# Patient Record
Sex: MALE | Race: WHITE | NOT HISPANIC OR LATINO | Employment: FULL TIME | URBAN - NONMETROPOLITAN AREA
[De-identification: names, ages, dates, MRNs, and addresses within clinical notes are randomized per-mention and may not be internally consistent; named-entity substitution may affect disease eponyms.]

---

## 2018-02-13 ENCOUNTER — APPOINTMENT (OUTPATIENT)
Dept: GENERAL RADIOLOGY | Facility: HOSPITAL | Age: 53
End: 2018-02-13

## 2018-02-13 PROCEDURE — 71046 X-RAY EXAM CHEST 2 VIEWS: CPT

## 2019-01-03 ENCOUNTER — OFFICE VISIT (OUTPATIENT)
Dept: GASTROENTEROLOGY | Facility: CLINIC | Age: 54
End: 2019-01-03

## 2019-01-03 VITALS
DIASTOLIC BLOOD PRESSURE: 90 MMHG | BODY MASS INDEX: 33.62 KG/M2 | TEMPERATURE: 98.1 F | HEART RATE: 91 BPM | SYSTOLIC BLOOD PRESSURE: 162 MMHG | HEIGHT: 74 IN | OXYGEN SATURATION: 98 % | WEIGHT: 262 LBS

## 2019-01-03 DIAGNOSIS — Z12.11 COLON CANCER SCREENING: Primary | ICD-10-CM

## 2019-01-03 PROCEDURE — S0285 CNSLT BEFORE SCREEN COLONOSC: HCPCS | Performed by: NURSE PRACTITIONER

## 2019-01-03 RX ORDER — SODIUM, POTASSIUM,MAG SULFATES 17.5-3.13G
1 SOLUTION, RECONSTITUTED, ORAL ORAL EVERY 12 HOURS
Qty: 1 BOTTLE | Refills: 0 | Status: SHIPPED | OUTPATIENT
Start: 2019-01-03 | End: 2019-04-23

## 2019-01-03 RX ORDER — OMEPRAZOLE 20 MG/1
20 CAPSULE, DELAYED RELEASE ORAL DAILY
COMMUNITY

## 2019-01-03 NOTE — PROGRESS NOTES
Chief Complaint   Patient presents with   • Colon Cancer Screening     Patient is here today for colon screening.     Subjective   HPI  Kvng Cedillo is a 53 y.o. male who presents as a referral for preventative maintenance. He has no complaints of nausea or vomiting. No change in bowels. No wt loss. No BRBPR. No melena. There is no family hx for colon cancer. No abdominal pain. There was no Cologuard screening this year. The patients states that he had a colonoscopy about 15 years ago found to be normal.   Past Medical History:   Diagnosis Date   • Diabetes mellitus (CMS/HCC)    • GERD (gastroesophageal reflux disease)    • Hyperlipidemia    • Hypertension    • Seasonal allergies      Past Surgical History:   Procedure Laterality Date   • CHOLECYSTECTOMY     • COLONOSCOPY  2002   • KNEE SURGERY     • UPPER GASTROINTESTINAL ENDOSCOPY  12/29/2005       Current Outpatient Medications:   •  AmLODIPine Besylate (NORVASC PO), Take  by mouth., Disp: , Rfl:   •  aspirin 81 MG EC tablet, Take 81 mg by mouth Daily., Disp: , Rfl:   •  IRBESARTAN PO, Take  by mouth., Disp: , Rfl:   •  METFORMIN HCL PO, Take  by mouth., Disp: , Rfl:   •  montelukast (SINGULAIR) 10 MG tablet, Take 10 mg by mouth Every Night., Disp: , Rfl:   •  omeprazole (priLOSEC) 20 MG capsule, Take 20 mg by mouth Daily., Disp: , Rfl:   •  PRAVASTATIN SODIUM PO, Take  by mouth., Disp: , Rfl:   •  sodium-potassium-magnesium sulfates (SUPREP BOWEL PREP KIT) 17.5-3.13-1.6 GM/177ML solution oral solution, Take 1 bottle by mouth Every 12 (Twelve) Hours., Disp: 1 bottle, Rfl: 0  Allergies   Allergen Reactions   • Ace Inhibitors Dizziness and Cough     Social History     Socioeconomic History   • Marital status:      Spouse name: Not on file   • Number of children: Not on file   • Years of education: Not on file   • Highest education level: Not on file   Social Needs   • Financial resource strain: Not on file   • Food insecurity - worry: Not on file   • Food  "insecurity - inability: Not on file   • Transportation needs - medical: Not on file   • Transportation needs - non-medical: Not on file   Occupational History   • Not on file   Tobacco Use   • Smoking status: Current Every Day Smoker     Packs/day: 1.00     Types: Cigarettes   • Smokeless tobacco: Never Used   Substance and Sexual Activity   • Alcohol use: Yes   • Drug use: No   • Sexual activity: Defer   Other Topics Concern   • Not on file   Social History Narrative   • Not on file     Family History   Problem Relation Age of Onset   • Colon cancer Neg Hx    • Colon polyps Neg Hx        REVIEW OF SYSTEMS  General: well appearing, no fever chills or sweats, no unexplained wt loss  HEENT: no acute visual or hearing disturbances  Cardiovascular: No chest pain or palpitations  Pulmonary: No shortness of breath, coughing, wheezing or hemoptysis  : No burning, urgency, hematuria, or dysuria  Musculoskeletal: No joint pain or stiffness  Peripheral: no edema  Skin: No lesions or rashes  Neuro: No dizziness, headaches, stroke, syncope  Endocrine: No hot or cold intolerances  Hematological: No blood dyscrasias    Objective   Vitals:    01/03/19 1441   BP: 162/90   Pulse: 91   Temp: 98.1 °F (36.7 °C)   SpO2: 98%   Weight: 119 kg (262 lb)   Height: 188 cm (74\")     Body mass index is 33.64 kg/m².    PHYSICAL EXAM  General: age appropriate well nourished well appearing, no acute distress  Head: normocephalic and atraumatic  Global assessment-supple  Neck-No JVD noted, no lymphadenopathy  Pulmonary-clear to auscultation bilaterally, normal respiratory effort  Cardiovascular-normal rate and rhythm, normal heart sounds, S1 and S2 noted  Abdomen-soft, non tender, non distended, normal bowel sounds all 4 quadrants, no hepatosplenomegaly noted  Extremities-No clubbing cyanosis or edema  Neuro-Non focal, converses appropriately, awake, alert, oriented    Imaging Results (most recent)     None        Assessment/Plan   Kvng was seen " today for colon cancer screening.    Diagnoses and all orders for this visit:    Colon cancer screening  -     Case Request; Standing  -     Case Request    Other orders  -     Follow Anesthesia Guidelines / Standing Orders; Future  -     Implement Anesthesia Orders Day of Procedure; Standing  -     Obtain Informed Consent; Standing  -     Verify bowel prep was successful; Standing  -     sodium-potassium-magnesium sulfates (SUPREP BOWEL PREP KIT) 17.5-3.13-1.6 GM/177ML solution oral solution; Take 1 bottle by mouth Every 12 (Twelve) Hours.      COLONOSCOPY WITH ANESTHESIA (N/A)       Body mass index is 33.64 kg/m². Patient's Body mass index is 33.64 kg/m². BMI is above normal parameters. Recommendations include: no follow-up required.      All risks, benefits, alternatives, and indications of colonoscopy procedure have been discussed with the patient. Risks to include perforation of the colon requiring possible surgery or colostomy, risk of bleeding from biopsies or removal of colon tissue, possibility of missing a colon polyp or cancer, or adverse drug reaction.  Benefits to include the diagnosis and management of disease of the colon and rectum. Alternatives to include barium enema, radiographic evaluation, lab testing or no intervention. Pt verbalizes understanding and agrees.

## 2019-01-25 ENCOUNTER — ANESTHESIA EVENT (OUTPATIENT)
Dept: GASTROENTEROLOGY | Facility: HOSPITAL | Age: 54
End: 2019-01-25

## 2019-01-25 ENCOUNTER — ANESTHESIA (OUTPATIENT)
Dept: GASTROENTEROLOGY | Facility: HOSPITAL | Age: 54
End: 2019-01-25

## 2019-01-25 ENCOUNTER — HOSPITAL ENCOUNTER (OUTPATIENT)
Facility: HOSPITAL | Age: 54
Setting detail: HOSPITAL OUTPATIENT SURGERY
Discharge: HOME | End: 2019-01-25
Attending: INTERNAL MEDICINE | Admitting: INTERNAL MEDICINE

## 2019-01-25 VITALS
SYSTOLIC BLOOD PRESSURE: 146 MMHG | DIASTOLIC BLOOD PRESSURE: 84 MMHG | WEIGHT: 255 LBS | TEMPERATURE: 97 F | OXYGEN SATURATION: 97 % | RESPIRATION RATE: 19 BRPM | HEIGHT: 73 IN | HEART RATE: 73 BPM | BODY MASS INDEX: 33.8 KG/M2

## 2019-01-25 DIAGNOSIS — Z12.11 COLON CANCER SCREENING: ICD-10-CM

## 2019-01-25 PROCEDURE — 88305 TISSUE EXAM BY PATHOLOGIST: CPT | Performed by: INTERNAL MEDICINE

## 2019-01-25 PROCEDURE — 25010000002 PROPOFOL 10 MG/ML EMULSION: Performed by: NURSE ANESTHETIST, CERTIFIED REGISTERED

## 2019-01-25 PROCEDURE — 45385 COLONOSCOPY W/LESION REMOVAL: CPT | Performed by: INTERNAL MEDICINE

## 2019-01-25 RX ORDER — SODIUM CHLORIDE 0.9 % (FLUSH) 0.9 %
3 SYRINGE (ML) INJECTION AS NEEDED
Status: DISCONTINUED | OUTPATIENT
Start: 2019-01-25 | End: 2019-01-25 | Stop reason: HOSPADM

## 2019-01-25 RX ORDER — LIDOCAINE HYDROCHLORIDE 20 MG/ML
INJECTION, SOLUTION INFILTRATION; PERINEURAL AS NEEDED
Status: DISCONTINUED | OUTPATIENT
Start: 2019-01-25 | End: 2019-01-25 | Stop reason: SURG

## 2019-01-25 RX ORDER — SODIUM CHLORIDE 9 MG/ML
500 INJECTION, SOLUTION INTRAVENOUS CONTINUOUS PRN
Status: DISCONTINUED | OUTPATIENT
Start: 2019-01-25 | End: 2019-01-25 | Stop reason: HOSPADM

## 2019-01-25 RX ORDER — PROPOFOL 10 MG/ML
VIAL (ML) INTRAVENOUS AS NEEDED
Status: DISCONTINUED | OUTPATIENT
Start: 2019-01-25 | End: 2019-01-25 | Stop reason: SURG

## 2019-01-25 RX ADMIN — LIDOCAINE HYDROCHLORIDE 100 MG: 20 INJECTION, SOLUTION INFILTRATION; PERINEURAL at 09:18

## 2019-01-25 RX ADMIN — SODIUM CHLORIDE 500 ML: 9 INJECTION, SOLUTION INTRAVENOUS at 07:55

## 2019-01-25 RX ADMIN — PROPOFOL 625 MG: 10 INJECTION, EMULSION INTRAVENOUS at 09:18

## 2019-01-25 NOTE — ANESTHESIA PREPROCEDURE EVALUATION
Anesthesia Evaluation     Patient summary reviewed   no history of anesthetic complications:  NPO Solid Status: > 8 hours             Airway   Mallampati: II  TM distance: >3 FB  Neck ROM: full  Dental      Pulmonary    (+) a smoker,   Cardiovascular   Exercise tolerance: excellent (>7 METS)    (+) hypertension, hyperlipidemia,       Neuro/Psych  (+) TIA,     GI/Hepatic/Renal/Endo    (+) obesity,   diabetes mellitus,     Musculoskeletal     Abdominal    Substance History      OB/GYN          Other                        Anesthesia Plan    ASA 2     general     intravenous induction   Anesthetic plan, all risks, benefits, and alternatives have been provided, discussed and informed consent has been obtained with: patient.

## 2019-01-25 NOTE — ANESTHESIA POSTPROCEDURE EVALUATION
"Patient: Kvng Cedillo    Procedure Summary     Date:  01/25/19 Room / Location:  [facility] ENDOSCOPY 5 / [facility]COPY    Anesthesia Start:  0914 Anesthesia Stop:  0951    Procedure:  COLONOSCOPY WITH ANESTHESIA (N/A ) Diagnosis:       Colon cancer screening      (Colon cancer screening [Z12.11])    Surgeon:  Jesika Mcdaniel MD Provider:  Yara Nunez CRNA    Anesthesia Type:  general ASA Status:  2          Anesthesia Type: general  Last vitals  BP   142/67 (01/25/19 0955)   Temp   97 °F (36.1 °C) (01/25/19 0749)   Pulse   98 (01/25/19 0955)   Resp   21 (01/25/19 0955)     SpO2   94 % (01/25/19 0955)     Post Anesthesia Care and Evaluation    Patient location during evaluation: bedside  Patient participation: complete - patient participated  Level of consciousness: awake and awake and alert  Pain score: 0  Pain management: adequate  Airway patency: patent  Anesthetic complications: No anesthetic complications  PONV Status: none  Cardiovascular status: acceptable  Respiratory status: acceptable  Hydration status: acceptable    Comments: Patient discharged according to acceptable Jennifer score per RN assessment. See nursing records for further information.     Blood pressure 142/67, pulse 98, temperature 97 °F (36.1 °C), temperature source Temporal, resp. rate 21, height 185.4 cm (73\"), weight 116 kg (255 lb), SpO2 94 %.        "

## 2019-01-28 LAB
CYTO UR: NORMAL
LAB AP CASE REPORT: NORMAL
LAB AP CLINICAL INFORMATION: NORMAL
PATH REPORT.FINAL DX SPEC: NORMAL
PATH REPORT.GROSS SPEC: NORMAL

## 2020-05-30 ENCOUNTER — HOSPITAL ENCOUNTER (EMERGENCY)
Facility: HOSPITAL | Age: 55
Discharge: HOME | End: 2020-05-30
Attending: FAMILY MEDICINE | Admitting: FAMILY MEDICINE

## 2020-05-30 VITALS
SYSTOLIC BLOOD PRESSURE: 162 MMHG | HEART RATE: 88 BPM | RESPIRATION RATE: 16 BRPM | OXYGEN SATURATION: 97 % | WEIGHT: 254 LBS | HEIGHT: 74 IN | BODY MASS INDEX: 32.6 KG/M2 | DIASTOLIC BLOOD PRESSURE: 100 MMHG | TEMPERATURE: 98 F

## 2020-05-30 DIAGNOSIS — K92.2 GASTROINTESTINAL HEMORRHAGE, UNSPECIFIED GASTROINTESTINAL HEMORRHAGE TYPE: Primary | ICD-10-CM

## 2020-05-30 LAB
ALBUMIN SERPL-MCNC: 4.8 G/DL (ref 3.5–5.2)
ALBUMIN/GLOB SERPL: 1.7 G/DL
ALP SERPL-CCNC: 66 U/L (ref 39–117)
ALT SERPL W P-5'-P-CCNC: 75 U/L (ref 1–41)
ANION GAP SERPL CALCULATED.3IONS-SCNC: 19 MMOL/L (ref 5–15)
AST SERPL-CCNC: 39 U/L (ref 1–40)
BASOPHILS # BLD AUTO: 0.07 10*3/MM3 (ref 0–0.2)
BASOPHILS NFR BLD AUTO: 0.8 % (ref 0–1.5)
BILIRUB SERPL-MCNC: 0.5 MG/DL (ref 0.2–1.2)
BUN BLD-MCNC: 12 MG/DL (ref 6–20)
BUN/CREAT SERPL: 16.7 (ref 7–25)
CALCIUM SPEC-SCNC: 9.7 MG/DL (ref 8.6–10.5)
CHLORIDE SERPL-SCNC: 98 MMOL/L (ref 98–107)
CO2 SERPL-SCNC: 21 MMOL/L (ref 22–29)
CREAT BLD-MCNC: 0.72 MG/DL (ref 0.76–1.27)
DEPRECATED RDW RBC AUTO: 37.1 FL (ref 37–54)
DEVELOPER EXPIRATION DATE: ABNORMAL
DEVELOPER LOT NUMBER: 171
EOSINOPHIL # BLD AUTO: 0.09 10*3/MM3 (ref 0–0.4)
EOSINOPHIL NFR BLD AUTO: 1 % (ref 0.3–6.2)
ERYTHROCYTE [DISTWIDTH] IN BLOOD BY AUTOMATED COUNT: 11.6 % (ref 12.3–15.4)
EXPIRATION DATE: ABNORMAL
FECAL OCCULT BLOOD SCREEN, POC: POSITIVE
GFR SERPL CREATININE-BSD FRML MDRD: 113 ML/MIN/1.73
GLOBULIN UR ELPH-MCNC: 2.9 GM/DL
GLUCOSE BLD-MCNC: 191 MG/DL (ref 65–99)
HCT VFR BLD AUTO: 45.2 % (ref 37.5–51)
HGB BLD-MCNC: 16.1 G/DL (ref 13–17.7)
HOLD SPECIMEN: NORMAL
HOLD SPECIMEN: NORMAL
IMM GRANULOCYTES # BLD AUTO: 0.02 10*3/MM3 (ref 0–0.05)
IMM GRANULOCYTES NFR BLD AUTO: 0.2 % (ref 0–0.5)
INR PPP: 1.04 (ref 0.91–1.09)
LYMPHOCYTES # BLD AUTO: 1.98 10*3/MM3 (ref 0.7–3.1)
LYMPHOCYTES NFR BLD AUTO: 22.9 % (ref 19.6–45.3)
Lab: 171
MCH RBC QN AUTO: 31.3 PG (ref 26.6–33)
MCHC RBC AUTO-ENTMCNC: 35.6 G/DL (ref 31.5–35.7)
MCV RBC AUTO: 87.8 FL (ref 79–97)
MONOCYTES # BLD AUTO: 0.71 10*3/MM3 (ref 0.1–0.9)
MONOCYTES NFR BLD AUTO: 8.2 % (ref 5–12)
NEGATIVE CONTROL: NEGATIVE
NEUTROPHILS # BLD AUTO: 5.78 10*3/MM3 (ref 1.7–7)
NEUTROPHILS NFR BLD AUTO: 66.9 % (ref 42.7–76)
NRBC BLD AUTO-RTO: 0 /100 WBC (ref 0–0.2)
PLATELET # BLD AUTO: 176 10*3/MM3 (ref 140–450)
PMV BLD AUTO: 9.7 FL (ref 6–12)
POSITIVE CONTROL: POSITIVE
POTASSIUM BLD-SCNC: 3.9 MMOL/L (ref 3.5–5.2)
PROT SERPL-MCNC: 7.7 G/DL (ref 6–8.5)
PROTHROMBIN TIME: 13.2 SECONDS (ref 11.9–14.6)
RBC # BLD AUTO: 5.15 10*6/MM3 (ref 4.14–5.8)
SODIUM BLD-SCNC: 138 MMOL/L (ref 136–145)
WBC NRBC COR # BLD: 8.65 10*3/MM3 (ref 3.4–10.8)
WHOLE BLOOD HOLD SPECIMEN: NORMAL
WHOLE BLOOD HOLD SPECIMEN: NORMAL

## 2020-05-30 PROCEDURE — 99283 EMERGENCY DEPT VISIT LOW MDM: CPT

## 2020-05-30 PROCEDURE — 85610 PROTHROMBIN TIME: CPT | Performed by: FAMILY MEDICINE

## 2020-05-30 PROCEDURE — 80053 COMPREHEN METABOLIC PANEL: CPT | Performed by: FAMILY MEDICINE

## 2020-05-30 PROCEDURE — 82270 OCCULT BLOOD FECES: CPT | Performed by: FAMILY MEDICINE

## 2020-05-30 PROCEDURE — 85025 COMPLETE CBC W/AUTO DIFF WBC: CPT | Performed by: FAMILY MEDICINE

## 2020-05-30 RX ORDER — MULTIVITAMIN WITH IRON
TABLET ORAL
COMMUNITY
End: 2022-12-07

## 2020-05-30 RX ORDER — TADALAFIL 5 MG/1
5 TABLET ORAL DAILY PRN
COMMUNITY

## 2020-05-30 RX ADMIN — SODIUM CHLORIDE 500 ML: 9 INJECTION, SOLUTION INTRAVENOUS at 17:44

## 2020-06-01 ENCOUNTER — TELEPHONE (OUTPATIENT)
Dept: GASTROENTEROLOGY | Facility: CLINIC | Age: 55
End: 2020-06-01

## 2020-06-01 NOTE — TELEPHONE ENCOUNTER
Patient was seen in the ER Saturday and the ER Dr called sw Dr. Bautista about pt. Pt was discharged with instructions to call our office today if he was still having issues. Dr. Bautista asked if I would call and check on pt today and see how he was doing. He states that he is feeling much better and not having anymore bleeding. I encouraged pt to call us it he does have anymore issue. Pt vu.

## 2021-03-25 ENCOUNTER — IMMUNIZATION (OUTPATIENT)
Dept: VACCINE CLINIC | Facility: HOSPITAL | Age: 56
End: 2021-03-25

## 2021-03-25 PROCEDURE — 91301 HC SARSCO02 VAC 100MCG/0.5ML IM: CPT | Performed by: OBSTETRICS & GYNECOLOGY

## 2021-03-25 PROCEDURE — 0011A: CPT | Performed by: OBSTETRICS & GYNECOLOGY

## 2021-04-22 ENCOUNTER — IMMUNIZATION (OUTPATIENT)
Dept: VACCINE CLINIC | Facility: HOSPITAL | Age: 56
End: 2021-04-22

## 2021-04-22 PROCEDURE — 0012A: CPT | Performed by: OBSTETRICS & GYNECOLOGY

## 2021-04-22 PROCEDURE — 91301 HC SARSCO02 VAC 100MCG/0.5ML IM: CPT | Performed by: OBSTETRICS & GYNECOLOGY

## 2022-12-07 ENCOUNTER — APPOINTMENT (OUTPATIENT)
Dept: CARDIOLOGY | Facility: HOSPITAL | Age: 57
End: 2022-12-07

## 2022-12-07 ENCOUNTER — APPOINTMENT (OUTPATIENT)
Dept: GENERAL RADIOLOGY | Facility: HOSPITAL | Age: 57
End: 2022-12-07

## 2022-12-07 ENCOUNTER — HOSPITAL ENCOUNTER (EMERGENCY)
Facility: HOSPITAL | Age: 57
Discharge: HOME | End: 2022-12-07
Attending: STUDENT IN AN ORGANIZED HEALTH CARE EDUCATION/TRAINING PROGRAM | Admitting: STUDENT IN AN ORGANIZED HEALTH CARE EDUCATION/TRAINING PROGRAM

## 2022-12-07 VITALS
HEIGHT: 74 IN | TEMPERATURE: 98 F | OXYGEN SATURATION: 100 % | RESPIRATION RATE: 18 BRPM | BODY MASS INDEX: 31.44 KG/M2 | SYSTOLIC BLOOD PRESSURE: 135 MMHG | HEART RATE: 81 BPM | WEIGHT: 245 LBS | DIASTOLIC BLOOD PRESSURE: 91 MMHG

## 2022-12-07 DIAGNOSIS — F17.200 SMOKER: ICD-10-CM

## 2022-12-07 DIAGNOSIS — R06.02 SHORTNESS OF BREATH: ICD-10-CM

## 2022-12-07 DIAGNOSIS — R07.9 ACUTE CHEST PAIN: Primary | ICD-10-CM

## 2022-12-07 DIAGNOSIS — R07.9 EXERTIONAL CHEST PAIN: ICD-10-CM

## 2022-12-07 DIAGNOSIS — Z86.39 HISTORY OF DIABETES MELLITUS: ICD-10-CM

## 2022-12-07 LAB
ALBUMIN SERPL-MCNC: 4.5 G/DL (ref 3.5–5.2)
ALBUMIN/GLOB SERPL: 1.9 G/DL
ALP SERPL-CCNC: 59 U/L (ref 39–117)
ALT SERPL W P-5'-P-CCNC: 38 U/L (ref 1–41)
ANION GAP SERPL CALCULATED.3IONS-SCNC: 9 MMOL/L (ref 5–15)
AST SERPL-CCNC: 23 U/L (ref 1–40)
BASOPHILS # BLD AUTO: 0.04 10*3/MM3 (ref 0–0.2)
BASOPHILS NFR BLD AUTO: 0.7 % (ref 0–1.5)
BH CV STRESS BP STAGE 1: NORMAL
BH CV STRESS BP STAGE 2: NORMAL
BH CV STRESS BP STAGE 3: NORMAL
BH CV STRESS DURATION MIN STAGE 1: 3
BH CV STRESS DURATION MIN STAGE 2: 3
BH CV STRESS DURATION MIN STAGE 3: 2
BH CV STRESS DURATION SEC STAGE 1: 0
BH CV STRESS DURATION SEC STAGE 2: 0
BH CV STRESS DURATION SEC STAGE 3: 0
BH CV STRESS GRADE STAGE 1: 10
BH CV STRESS GRADE STAGE 2: 12
BH CV STRESS GRADE STAGE 3: 14
BH CV STRESS HR STAGE 1: 120
BH CV STRESS HR STAGE 2: 142
BH CV STRESS HR STAGE 3: 162
BH CV STRESS METS STAGE 1: 5
BH CV STRESS METS STAGE 2: 7.5
BH CV STRESS METS STAGE 3: 10
BH CV STRESS PROTOCOL 1: NORMAL
BH CV STRESS RECOVERY BP: NORMAL MMHG
BH CV STRESS RECOVERY HR: 100 BPM
BH CV STRESS SPEED STAGE 1: 1.7
BH CV STRESS SPEED STAGE 2: 2.5
BH CV STRESS SPEED STAGE 3: 3.4
BH CV STRESS STAGE 1: 1
BH CV STRESS STAGE 2: 2
BH CV STRESS STAGE 3: 3
BILIRUB SERPL-MCNC: 0.6 MG/DL (ref 0–1.2)
BUN SERPL-MCNC: 19 MG/DL (ref 6–20)
BUN/CREAT SERPL: 24.1 (ref 7–25)
CALCIUM SPEC-SCNC: 8.6 MG/DL (ref 8.6–10.5)
CHLORIDE SERPL-SCNC: 101 MMOL/L (ref 98–107)
CO2 SERPL-SCNC: 25 MMOL/L (ref 22–29)
CREAT SERPL-MCNC: 0.79 MG/DL (ref 0.76–1.27)
DEPRECATED RDW RBC AUTO: 38.7 FL (ref 37–54)
EGFRCR SERPLBLD CKD-EPI 2021: 103 ML/MIN/1.73
EOSINOPHIL # BLD AUTO: 0.11 10*3/MM3 (ref 0–0.4)
EOSINOPHIL NFR BLD AUTO: 2 % (ref 0.3–6.2)
ERYTHROCYTE [DISTWIDTH] IN BLOOD BY AUTOMATED COUNT: 11.8 % (ref 12.3–15.4)
GLOBULIN UR ELPH-MCNC: 2.4 GM/DL
GLUCOSE SERPL-MCNC: 176 MG/DL (ref 65–99)
HCT VFR BLD AUTO: 46.3 % (ref 37.5–51)
HGB BLD-MCNC: 16.1 G/DL (ref 13–17.7)
HOLD SPECIMEN: NORMAL
HOLD SPECIMEN: NORMAL
IMM GRANULOCYTES # BLD AUTO: 0.02 10*3/MM3 (ref 0–0.05)
IMM GRANULOCYTES NFR BLD AUTO: 0.4 % (ref 0–0.5)
INR PPP: 0.97 (ref 0.91–1.09)
LYMPHOCYTES # BLD AUTO: 1.61 10*3/MM3 (ref 0.7–3.1)
LYMPHOCYTES NFR BLD AUTO: 28.6 % (ref 19.6–45.3)
MAXIMAL PREDICTED HEART RATE: 162 BPM
MCH RBC QN AUTO: 31.5 PG (ref 26.6–33)
MCHC RBC AUTO-ENTMCNC: 34.8 G/DL (ref 31.5–35.7)
MCV RBC AUTO: 90.6 FL (ref 79–97)
MONOCYTES # BLD AUTO: 0.51 10*3/MM3 (ref 0.1–0.9)
MONOCYTES NFR BLD AUTO: 9.1 % (ref 5–12)
NEUTROPHILS NFR BLD AUTO: 3.33 10*3/MM3 (ref 1.7–7)
NEUTROPHILS NFR BLD AUTO: 59.2 % (ref 42.7–76)
NRBC BLD AUTO-RTO: 0 /100 WBC (ref 0–0.2)
NT-PROBNP SERPL-MCNC: <36 PG/ML (ref 0–900)
PERCENT MAX PREDICTED HR: 100 %
PLATELET # BLD AUTO: 182 10*3/MM3 (ref 140–450)
PMV BLD AUTO: 9.3 FL (ref 6–12)
POTASSIUM SERPL-SCNC: 4.3 MMOL/L (ref 3.5–5.2)
PROT SERPL-MCNC: 6.9 G/DL (ref 6–8.5)
PROTHROMBIN TIME: 12.9 SECONDS (ref 11.8–14.8)
RBC # BLD AUTO: 5.11 10*6/MM3 (ref 4.14–5.8)
SODIUM SERPL-SCNC: 135 MMOL/L (ref 136–145)
STRESS BASELINE BP: NORMAL MMHG
STRESS BASELINE HR: 78 BPM
STRESS PERCENT HR: 118 %
STRESS POST ESTIMATED WORKLOAD: 10 METS
STRESS POST EXERCISE DUR MIN: 8 MIN
STRESS POST EXERCISE DUR SEC: 0 SEC
STRESS POST PEAK BP: NORMAL MMHG
STRESS POST PEAK HR: 162 BPM
STRESS TARGET HR: 138 BPM
TROPONIN T SERPL-MCNC: <0.01 NG/ML (ref 0–0.03)
TROPONIN T SERPL-MCNC: <0.01 NG/ML (ref 0–0.03)
WBC NRBC COR # BLD: 5.62 10*3/MM3 (ref 3.4–10.8)
WHOLE BLOOD HOLD COAG: NORMAL
WHOLE BLOOD HOLD SPECIMEN: NORMAL

## 2022-12-07 PROCEDURE — 80053 COMPREHEN METABOLIC PANEL: CPT | Performed by: STUDENT IN AN ORGANIZED HEALTH CARE EDUCATION/TRAINING PROGRAM

## 2022-12-07 PROCEDURE — 85025 COMPLETE CBC W/AUTO DIFF WBC: CPT | Performed by: STUDENT IN AN ORGANIZED HEALTH CARE EDUCATION/TRAINING PROGRAM

## 2022-12-07 PROCEDURE — 84484 ASSAY OF TROPONIN QUANT: CPT | Performed by: STUDENT IN AN ORGANIZED HEALTH CARE EDUCATION/TRAINING PROGRAM

## 2022-12-07 PROCEDURE — 71045 X-RAY EXAM CHEST 1 VIEW: CPT

## 2022-12-07 PROCEDURE — 93352 ADMIN ECG CONTRAST AGENT: CPT | Performed by: INTERNAL MEDICINE

## 2022-12-07 PROCEDURE — 99284 EMERGENCY DEPT VISIT MOD MDM: CPT

## 2022-12-07 PROCEDURE — 25010000002 PERFLUTREN 6.52 MG/ML SUSPENSION: Performed by: INTERNAL MEDICINE

## 2022-12-07 PROCEDURE — 93350 STRESS TTE ONLY: CPT | Performed by: INTERNAL MEDICINE

## 2022-12-07 PROCEDURE — 93005 ELECTROCARDIOGRAM TRACING: CPT | Performed by: STUDENT IN AN ORGANIZED HEALTH CARE EDUCATION/TRAINING PROGRAM

## 2022-12-07 PROCEDURE — 93017 CV STRESS TEST TRACING ONLY: CPT

## 2022-12-07 PROCEDURE — 93350 STRESS TTE ONLY: CPT

## 2022-12-07 PROCEDURE — 93010 ELECTROCARDIOGRAM REPORT: CPT | Performed by: INTERNAL MEDICINE

## 2022-12-07 PROCEDURE — 85610 PROTHROMBIN TIME: CPT | Performed by: STUDENT IN AN ORGANIZED HEALTH CARE EDUCATION/TRAINING PROGRAM

## 2022-12-07 PROCEDURE — 36415 COLL VENOUS BLD VENIPUNCTURE: CPT

## 2022-12-07 PROCEDURE — 93018 CV STRESS TEST I&R ONLY: CPT | Performed by: INTERNAL MEDICINE

## 2022-12-07 PROCEDURE — 83880 ASSAY OF NATRIURETIC PEPTIDE: CPT | Performed by: STUDENT IN AN ORGANIZED HEALTH CARE EDUCATION/TRAINING PROGRAM

## 2022-12-07 PROCEDURE — 93005 ELECTROCARDIOGRAM TRACING: CPT

## 2022-12-07 RX ORDER — ASPIRIN 81 MG/1
324 TABLET, CHEWABLE ORAL ONCE
Status: DISCONTINUED | OUTPATIENT
Start: 2022-12-07 | End: 2022-12-07

## 2022-12-07 RX ORDER — SODIUM CHLORIDE 0.9 % (FLUSH) 0.9 %
10 SYRINGE (ML) INJECTION AS NEEDED
Status: DISCONTINUED | OUTPATIENT
Start: 2022-12-07 | End: 2022-12-07 | Stop reason: HOSPADM

## 2022-12-07 RX ADMIN — PERFLUTREN 8.48 MG: 6.52 INJECTION, SUSPENSION INTRAVENOUS at 09:36

## 2022-12-07 NOTE — ED PROVIDER NOTES
EMERGENCY DEPARTMENT HISTORY AND PHYSICAL EXAM    Patient Name: Kvng Cedillo    Chief Complaint   Patient presents with   • Chest Pain       History of Presenting Illness:  Kvng Cedillo is a 57 y.o. male with history of diabetes mellitus as well as hypertension hyperlipidemia presents emerged department due to chest pain.    Patient states he is feeling fine and woke up.  He was walking up a hill when he had exertional chest pain described as right-sided in nature folic it took his breath away.  This is ultimately presents the emergency department.  He has had stress test in the past at [facility] but not for many years.  He has history of diabetes hyperlipidemia hypertension and is a smoker.  Denies any family history myocardial infarction.  No recent chills or fever.  No nausea vomiting or abdominal pain.  Currently is chest pain-free.    Patient took 325 mg of aspirin earlier this morning.      Past Medical History:   Past Medical History:   Diagnosis Date   • Diabetes mellitus (HCC)    • GERD (gastroesophageal reflux disease)    • Hyperlipidemia    • Hypertension    • Seasonal allergies    • TIA (transient ischemic attack)        Past Surgical History:   Past Surgical History:   Procedure Laterality Date   • CHOLECYSTECTOMY     • COLONOSCOPY  2002   • COLONOSCOPY N/A 1/25/2019    Procedure: COLONOSCOPY WITH ANESTHESIA;  Surgeon: Jesika Mcdaniel MD;  Location: [facility] ENDOSCOPY;  Service: Gastroenterology   • KNEE SURGERY     • UPPER GASTROINTESTINAL ENDOSCOPY  12/29/2005       Family History:   Family History   Problem Relation Age of Onset   • Colon cancer Neg Hx    • Colon polyps Neg Hx        Social History:   Daily tobacco  Denies EtOH  Denies marijuana, cocaine, or IV drugs    Allergies:   Allergies   Allergen Reactions   • Ace Inhibitors Dizziness and Cough       Medications:     Current Facility-Administered Medications:   •  sodium chloride 0.9 % flush 10 mL, 10 mL, Intravenous, PRN,  "Kash Madrigal MD    Current Outpatient Medications:   •  alfuzosin (UROXATRAL) 10 MG 24 hr tablet, Take  by mouth Daily., Disp: , Rfl: 5  •  AmLODIPine Besylate (NORVASC PO), Take 5 mg by mouth Daily., Disp: , Rfl:   •  aspirin 81 MG EC tablet, Take 325 mg by mouth Daily., Disp: , Rfl:   •  Fexofenadine HCl (MUCINEX ALLERGY PO), Take  by mouth As Needed., Disp: , Rfl:   •  IRBESARTAN PO, Take  by mouth., Disp: , Rfl:   •  omeprazole (priLOSEC) 20 MG capsule, Take 20 mg by mouth Daily., Disp: , Rfl:   •  PRAVASTATIN SODIUM PO, Take  by mouth., Disp: , Rfl:   •  tadalafil (CIALIS) 5 MG tablet, Take 5 mg by mouth Daily As Needed for Erectile Dysfunction., Disp: , Rfl:   •  METFORMIN HCL PO, Take  by mouth., Disp: , Rfl:     Review of Systems:  A full review of systems was obtained and is negative unless otherwise stated in HPI.    Physical Exam:   VS: /88   Pulse 77   Temp 97.9 °F (36.6 °C)   Resp 18   Ht 188 cm (74\")   Wt 111 kg (245 lb)   SpO2 95%   BMI 31.46 kg/m²   GENERAL: Well-appearing middle-age man sitting up in stretcher no acute distress; well nourished, well developed, awake, alert, no acute distress, nontoxic appearing, comfortable  EYES: PERRL, sclera anicteric, extra-occular movements grossly intact, symmetric lids  EARS, NOSE, MOUTH, THROAT: atraumatic external nose and ears, moist mucous membranes  NECK: Symmetric, trachea midline, no thyromegaly, no adenopathy, no meningismus  RESPIRATORY: Unlabored respiratory effort, clear to auscultation bilaterally, good air movement  CARDIOVASCULAR: No murmurs or gallops, peripheral pulses 2+ and equal in all extremities  GI: Soft, nontender, nondistended, bowel sounds present, no hepatosplenomegaly  LYMPHATIC: no lymphadenopathy  MUSCULOSKELETAL/EXTREMITIES: Extremities without obvious deformity, no cyanosis or clubbing  SKIN: warm and dry with no obvious rashes  NEUROLOGIC: moving all 4 extremities symmetrically, CN II-XII grossly " intact  PSYCHIATRIC: alert, pleasant and cooperative. Appropriate mood and affect.      Labs:  Labs Reviewed   COMPREHENSIVE METABOLIC PANEL - Abnormal; Notable for the following components:       Result Value    Glucose 176 (*)     Sodium 135 (*)     All other components within normal limits    Narrative:     GFR Normal >60  Chronic Kidney Disease <60  Kidney Failure <15     CBC WITH AUTO DIFFERENTIAL - Abnormal; Notable for the following components:    RDW 11.8 (*)     All other components within normal limits   TROPONIN (IN-HOUSE) - Normal    Narrative:     Troponin T Reference Range:  <= 0.03 ng/mL-   Negative for AMI  >0.03 ng/mL-     Abnormal for myocardial necrosis.  Clinicians would have to utilize clinical acumen, EKG, Troponin and serial changes to determine if it is an Acute Myocardial Infarction or myocardial injury due to an underlying chronic condition.       Results may be falsely decreased if patient taking Biotin.     PROBNP (REFERENCE) - Normal    Narrative:     Among patients with dyspnea, NT-proBNP is highly sensitive for the detection of acute congestive heart failure. In addition NT-proBNP of <300 pg/ml effectively rules out acute congestive heart failure with 99% negative predictive value.     PROTIME-INR - Normal   RAINBOW DRAW    Narrative:     The following orders were created for panel order [facility] Draw.  Procedure                               Abnormality         Status                     ---------                               -----------         ------                     Green Top (Gel)[[id number]]                                  Final result               Lavender Top[[id number]]                                     Final result               Red Top[[id number]]                                          Final result               Light Blue Top[[id number]]                                   Final result                 Please view results for these tests on the individual orders.    TROPONIN (IN-HOUSE)   CBC AND DIFFERENTIAL    Narrative:     The following orders were created for panel order CBC & Differential.  Procedure                               Abnormality         Status                     ---------                               -----------         ------                     CBC Auto Differential[[id number]]        Abnormal            Final result                 Please view results for these tests on the individual orders.   GREEN TOP   LAVENDER TOP   RED TOP   LIGHT BLUE TOP         Radiology:   XR Chest 1 View   Final Result   1. No active cardiopulmonary disease.   This report was finalized on 12/07/2022 06:04 by Dr. Angely Jensen MD.            Medical Decision Making:  Kvng Cedillo is a 57 y.o. male with a history of diabetes hypertension hyperlipidemia presents emergency department due to chest pain.    Reassuring vital signs and arrival.    I have reviewed and interpreted the EKG and it shows: NSR, rate of 88. Normal axis and intervals. No signs of acute ischemia such as ST elevation, ST depression, or T wave inversion. No signs of Hyperkalemia, WPW, PE, Pericarditis, LV aneurysm, Brugada, Heart Block, Atrial fibrillation or A flutter.    HEART score of 4.     Labs were ordered and reviewed.  CBC with normal white blood cell count hemoglobin.  Normal INR.  Normal BNP.  Normal blood cell count hemoglobin.  CMP is relatively unremarkable.  Negative troponin.    Imaging was ordered and reviewed.  Chest x-ray with no acute abnormality per my read.  Radiology read is pending.    Nursing notes were reviewed.    Unclear exact etiology of the patient's chest pain this time.  Given its exertional nature and concern for unstable angina.  He has had prior negative stress test but not for over a year.  Noted to STEMI based on EKG.  No evidence of NSTEMI based on a troponins.  Suspect for pulmonary embolism given history with normal vital signs on arrival.  Would consider aortic  dissection but given his benign appearance with equal pulses bilateral I feel this is unlikely is not anterior chest pain is not radiating and at time of my interview he was chest pain-free.  Only occurring with exertion making this less likely.    At time of my signout to the [facility] emergency medicine attending, Dr. Ramirez, cardiac stress testing is pending.  Final disposition pending results of cardiac stress testing.      ED Diagnosis:  Acute chest pain  Shortness of breath  Exertional chest pain  Smoker  History of diabetes mellitus      Disposition: pending results of cardiac stress testing at time of signout to the [facility] emergency medicine attending, Dr. Ramirez        Signed:  Kash Madrigal MD  Emergency Medicine Physician    Please note that portions of this note were completed with a voice recognition program.      Kash Madrigal MD  12/07/22 0700

## 2022-12-07 NOTE — ED PROVIDER NOTES
I took over the care of this patient from Dr. Madrigal, please see his note for the full history and physical.  We were awaiting a stress test which came back negative.  The patient was reassured by this and discharged home in stable condition.  He will follow-up with his primary care physician and was advised to monitor his blood pressure at home and keep a log     Omar Ramirez MD  12/07/22 1038

## 2022-12-08 LAB
QT INTERVAL: 382 MS
QT INTERVAL: 388 MS
QTC INTERVAL: 447 MS
QTC INTERVAL: 462 MS

## 2023-07-30 ENCOUNTER — HOSPITAL ENCOUNTER (INPATIENT)
Facility: HOSPITAL | Age: 58
LOS: 2 days | Discharge: HOME | DRG: 372 | End: 2023-08-01
Attending: EMERGENCY MEDICINE | Admitting: INTERNAL MEDICINE
Payer: COMMERCIAL

## 2023-07-30 ENCOUNTER — APPOINTMENT (OUTPATIENT)
Dept: CT IMAGING | Facility: HOSPITAL | Age: 58
DRG: 372 | End: 2023-07-30
Payer: COMMERCIAL

## 2023-07-30 DIAGNOSIS — I95.89 HYPOTENSION DUE TO HYPOVOLEMIA: ICD-10-CM

## 2023-07-30 DIAGNOSIS — R19.7 DIARRHEA, UNSPECIFIED TYPE: ICD-10-CM

## 2023-07-30 DIAGNOSIS — E86.1 HYPOTENSION DUE TO HYPOVOLEMIA: ICD-10-CM

## 2023-07-30 DIAGNOSIS — N17.9 ACUTE RENAL FAILURE, UNSPECIFIED ACUTE RENAL FAILURE TYPE: Primary | ICD-10-CM

## 2023-07-30 DIAGNOSIS — E87.6 HYPOKALEMIA: ICD-10-CM

## 2023-07-30 DIAGNOSIS — E86.9 VOLUME DEPLETION: ICD-10-CM

## 2023-07-30 PROBLEM — I95.9 HYPOTENSION: Status: ACTIVE | Noted: 2023-07-30

## 2023-07-30 LAB
ALBUMIN SERPL-MCNC: 4.4 G/DL (ref 3.5–5.2)
ALBUMIN/GLOB SERPL: 1.4 G/DL
ALP SERPL-CCNC: 46 U/L (ref 39–117)
ALT SERPL W P-5'-P-CCNC: 58 U/L (ref 1–41)
ANION GAP SERPL CALCULATED.3IONS-SCNC: 18 MMOL/L (ref 5–15)
AST SERPL-CCNC: 33 U/L (ref 1–40)
BILIRUB SERPL-MCNC: 0.9 MG/DL (ref 0–1.2)
BILIRUB UR QL STRIP: NEGATIVE
BUN SERPL-MCNC: 39 MG/DL (ref 6–20)
BUN/CREAT SERPL: 22.9 (ref 7–25)
BURR CELLS BLD QL SMEAR: ABNORMAL
C DIFF TOX GENS STL QL NAA+PROBE: NEGATIVE
CALCIUM SPEC-SCNC: 9.5 MG/DL (ref 8.6–10.5)
CHLORIDE SERPL-SCNC: 88 MMOL/L (ref 98–107)
CLARITY UR: CLEAR
CO2 SERPL-SCNC: 20 MMOL/L (ref 22–29)
COLOR UR: YELLOW
CREAT SERPL-MCNC: 1.7 MG/DL (ref 0.76–1.27)
D-LACTATE SERPL-SCNC: 1.7 MMOL/L (ref 0.5–2)
D-LACTATE SERPL-SCNC: 3.1 MMOL/L (ref 0.5–2)
DEPRECATED RDW RBC AUTO: 35.8 FL (ref 37–54)
EGFRCR SERPLBLD CKD-EPI 2021: 45.9 ML/MIN/1.73
EOSINOPHIL # BLD MANUAL: 0.06 10*3/MM3 (ref 0–0.4)
EOSINOPHIL NFR BLD MANUAL: 1 % (ref 0.3–6.2)
ERYTHROCYTE [DISTWIDTH] IN BLOOD BY AUTOMATED COUNT: 11.8 % (ref 12.3–15.4)
GIANT PLATELETS: ABNORMAL
GLOBULIN UR ELPH-MCNC: 3.2 GM/DL
GLUCOSE BLDC GLUCOMTR-MCNC: 244 MG/DL (ref 70–130)
GLUCOSE SERPL-MCNC: 294 MG/DL (ref 65–99)
GLUCOSE UR STRIP-MCNC: ABNORMAL MG/DL
HCT VFR BLD AUTO: 49.2 % (ref 37.5–51)
HGB BLD-MCNC: 17.5 G/DL (ref 13–17.7)
HGB UR QL STRIP.AUTO: NEGATIVE
KETONES UR QL STRIP: NEGATIVE
LEUKOCYTE ESTERASE UR QL STRIP.AUTO: NEGATIVE
LIPASE SERPL-CCNC: 196 U/L (ref 13–60)
LYMPHOCYTES # BLD MANUAL: 0.64 10*3/MM3 (ref 0.7–3.1)
LYMPHOCYTES NFR BLD MANUAL: 18 % (ref 5–12)
MAGNESIUM SERPL-MCNC: 2.4 MG/DL (ref 1.6–2.6)
MCH RBC QN AUTO: 30.1 PG (ref 26.6–33)
MCHC RBC AUTO-ENTMCNC: 35.6 G/DL (ref 31.5–35.7)
MCV RBC AUTO: 84.7 FL (ref 79–97)
METAMYELOCYTES NFR BLD MANUAL: 3 % (ref 0–0)
MICROCYTES BLD QL: ABNORMAL
MONOCYTES # BLD: 1.15 10*3/MM3 (ref 0.1–0.9)
MYELOCYTES NFR BLD MANUAL: 1 % (ref 0–0)
NEUTROPHILS # BLD AUTO: 4.21 10*3/MM3 (ref 1.7–7)
NEUTROPHILS NFR BLD MANUAL: 39 % (ref 42.7–76)
NEUTS BAND NFR BLD MANUAL: 27 % (ref 0–5)
NITRITE UR QL STRIP: NEGATIVE
PH UR STRIP.AUTO: 5.5 [PH] (ref 5–8)
PLASMA CELL PREC NFR BLD MANUAL: 1 % (ref 0–0)
PLATELET # BLD AUTO: 240 10*3/MM3 (ref 140–450)
PMV BLD AUTO: 10.1 FL (ref 6–12)
POIKILOCYTOSIS BLD QL SMEAR: ABNORMAL
POTASSIUM SERPL-SCNC: 2.7 MMOL/L (ref 3.5–5.2)
PROT SERPL-MCNC: 7.6 G/DL (ref 6–8.5)
PROT UR QL STRIP: ABNORMAL
RBC # BLD AUTO: 5.81 10*6/MM3 (ref 4.14–5.8)
SODIUM SERPL-SCNC: 126 MMOL/L (ref 136–145)
SP GR UR STRIP: 1.02 (ref 1–1.03)
UROBILINOGEN UR QL STRIP: ABNORMAL
VARIANT LYMPHS NFR BLD MANUAL: 2 % (ref 0–5)
VARIANT LYMPHS NFR BLD MANUAL: 8 % (ref 19.6–45.3)
WBC MORPH BLD: NORMAL
WBC NRBC COR # BLD: 6.38 10*3/MM3 (ref 3.4–10.8)

## 2023-07-30 PROCEDURE — 83735 ASSAY OF MAGNESIUM: CPT

## 2023-07-30 PROCEDURE — 36415 COLL VENOUS BLD VENIPUNCTURE: CPT

## 2023-07-30 PROCEDURE — 85025 COMPLETE CBC W/AUTO DIFF WBC: CPT

## 2023-07-30 PROCEDURE — 03HY32Z INSERTION OF MONITORING DEVICE INTO UPPER ARTERY, PERCUTANEOUS APPROACH: ICD-10-PCS | Performed by: EMERGENCY MEDICINE

## 2023-07-30 PROCEDURE — 81003 URINALYSIS AUTO W/O SCOPE: CPT

## 2023-07-30 PROCEDURE — 87040 BLOOD CULTURE FOR BACTERIA: CPT

## 2023-07-30 PROCEDURE — 80053 COMPREHEN METABOLIC PANEL: CPT

## 2023-07-30 PROCEDURE — 25010000002 ONDANSETRON PER 1 MG

## 2023-07-30 PROCEDURE — 4A133B1 MONITORING OF ARTERIAL PRESSURE, PERIPHERAL, PERCUTANEOUS APPROACH: ICD-10-PCS | Performed by: EMERGENCY MEDICINE

## 2023-07-30 PROCEDURE — 99285 EMERGENCY DEPT VISIT HI MDM: CPT

## 2023-07-30 PROCEDURE — 82948 REAGENT STRIP/BLOOD GLUCOSE: CPT

## 2023-07-30 PROCEDURE — 93005 ELECTROCARDIOGRAM TRACING: CPT

## 2023-07-30 PROCEDURE — 25010000002 LEVOFLOXACIN PER 250 MG: Performed by: FAMILY MEDICINE

## 2023-07-30 PROCEDURE — 93010 ELECTROCARDIOGRAM REPORT: CPT | Performed by: INTERNAL MEDICINE

## 2023-07-30 PROCEDURE — 83605 ASSAY OF LACTIC ACID: CPT

## 2023-07-30 PROCEDURE — 83690 ASSAY OF LIPASE: CPT

## 2023-07-30 PROCEDURE — 25010000002 CEFTRIAXONE PER 250 MG

## 2023-07-30 PROCEDURE — 4A133J1 MONITORING OF ARTERIAL PULSE, PERIPHERAL, PERCUTANEOUS APPROACH: ICD-10-PCS | Performed by: EMERGENCY MEDICINE

## 2023-07-30 PROCEDURE — 87507 IADNA-DNA/RNA PROBE TQ 12-25: CPT | Performed by: FAMILY MEDICINE

## 2023-07-30 PROCEDURE — 25010000002 METRONIDAZOLE 500 MG/100ML SOLUTION

## 2023-07-30 PROCEDURE — 63710000001 INSULIN LISPRO (HUMAN) PER 5 UNITS: Performed by: FAMILY MEDICINE

## 2023-07-30 PROCEDURE — 74177 CT ABD & PELVIS W/CONTRAST: CPT

## 2023-07-30 PROCEDURE — 25510000001 IOPAMIDOL 61 % SOLUTION: Performed by: EMERGENCY MEDICINE

## 2023-07-30 PROCEDURE — 06HY33Z INSERTION OF INFUSION DEVICE INTO LOWER VEIN, PERCUTANEOUS APPROACH: ICD-10-PCS | Performed by: EMERGENCY MEDICINE

## 2023-07-30 PROCEDURE — 87493 C DIFF AMPLIFIED PROBE: CPT | Performed by: FAMILY MEDICINE

## 2023-07-30 PROCEDURE — 85007 BL SMEAR W/DIFF WBC COUNT: CPT

## 2023-07-30 PROCEDURE — 0 POTASSIUM CHLORIDE PER 2 MEQ

## 2023-07-30 PROCEDURE — 25010000002 METRONIDAZOLE 500 MG/100ML SOLUTION: Performed by: FAMILY MEDICINE

## 2023-07-30 RX ORDER — SODIUM CHLORIDE 0.9 % (FLUSH) 0.9 %
10 SYRINGE (ML) INJECTION AS NEEDED
Status: DISCONTINUED | OUTPATIENT
Start: 2023-07-30 | End: 2023-08-01 | Stop reason: HOSPADM

## 2023-07-30 RX ORDER — NICOTINE POLACRILEX 4 MG
15 LOZENGE BUCCAL
Status: DISCONTINUED | OUTPATIENT
Start: 2023-07-30 | End: 2023-08-01 | Stop reason: HOSPADM

## 2023-07-30 RX ORDER — POTASSIUM CHLORIDE 750 MG/1
40 CAPSULE, EXTENDED RELEASE ORAL EVERY 4 HOURS
Status: COMPLETED | OUTPATIENT
Start: 2023-07-30 | End: 2023-07-30

## 2023-07-30 RX ORDER — METRONIDAZOLE 500 MG/100ML
500 INJECTION, SOLUTION INTRAVENOUS EVERY 8 HOURS
Status: DISCONTINUED | OUTPATIENT
Start: 2023-07-30 | End: 2023-08-01 | Stop reason: HOSPADM

## 2023-07-30 RX ORDER — LEVOFLOXACIN 5 MG/ML
750 INJECTION, SOLUTION INTRAVENOUS EVERY 24 HOURS
Status: DISCONTINUED | OUTPATIENT
Start: 2023-07-30 | End: 2023-08-01

## 2023-07-30 RX ORDER — INSULIN LISPRO 100 [IU]/ML
2-9 INJECTION, SOLUTION INTRAVENOUS; SUBCUTANEOUS
Status: DISCONTINUED | OUTPATIENT
Start: 2023-07-30 | End: 2023-08-01 | Stop reason: HOSPADM

## 2023-07-30 RX ORDER — SODIUM CHLORIDE 9 MG/ML
100 INJECTION, SOLUTION INTRAVENOUS CONTINUOUS
Status: DISCONTINUED | OUTPATIENT
Start: 2023-07-30 | End: 2023-08-01 | Stop reason: HOSPADM

## 2023-07-30 RX ORDER — METRONIDAZOLE 500 MG/100ML
500 INJECTION, SOLUTION INTRAVENOUS ONCE
Status: COMPLETED | OUTPATIENT
Start: 2023-07-30 | End: 2023-07-30

## 2023-07-30 RX ORDER — DEXTROSE MONOHYDRATE 25 G/50ML
25 INJECTION, SOLUTION INTRAVENOUS
Status: DISCONTINUED | OUTPATIENT
Start: 2023-07-30 | End: 2023-08-01 | Stop reason: HOSPADM

## 2023-07-30 RX ORDER — POTASSIUM CHLORIDE 29.8 MG/ML
20 INJECTION INTRAVENOUS ONCE
Status: COMPLETED | OUTPATIENT
Start: 2023-07-30 | End: 2023-07-30

## 2023-07-30 RX ORDER — NOREPINEPHRINE BITARTRATE 0.03 MG/ML
.02-.3 INJECTION, SOLUTION INTRAVENOUS
Status: DISCONTINUED | OUTPATIENT
Start: 2023-07-30 | End: 2023-07-30

## 2023-07-30 RX ORDER — ONDANSETRON 2 MG/ML
4 INJECTION INTRAMUSCULAR; INTRAVENOUS ONCE
Status: COMPLETED | OUTPATIENT
Start: 2023-07-30 | End: 2023-07-30

## 2023-07-30 RX ADMIN — SODIUM CHLORIDE 1000 ML: 9 INJECTION, SOLUTION INTRAVENOUS at 12:49

## 2023-07-30 RX ADMIN — POTASSIUM CHLORIDE 20 MEQ: 29.8 INJECTION, SOLUTION INTRAVENOUS at 14:43

## 2023-07-30 RX ADMIN — CEFTRIAXONE 1000 MG: 1 INJECTION, POWDER, FOR SOLUTION INTRAMUSCULAR; INTRAVENOUS at 15:04

## 2023-07-30 RX ADMIN — INSULIN LISPRO 4 UNITS: 100 INJECTION, SOLUTION INTRAVENOUS; SUBCUTANEOUS at 20:32

## 2023-07-30 RX ADMIN — ONDANSETRON 4 MG: 2 INJECTION INTRAMUSCULAR; INTRAVENOUS at 13:32

## 2023-07-30 RX ADMIN — METRONIDAZOLE 500 MG: 500 INJECTION, SOLUTION INTRAVENOUS at 18:03

## 2023-07-30 RX ADMIN — IOPAMIDOL 100 ML: 612 INJECTION, SOLUTION INTRAVENOUS at 16:09

## 2023-07-30 RX ADMIN — SODIUM CHLORIDE 100 ML/HR: 900 INJECTION INTRAVENOUS at 18:05

## 2023-07-30 RX ADMIN — METRONIDAZOLE 500 MG: 500 INJECTION, SOLUTION INTRAVENOUS at 15:06

## 2023-07-30 RX ADMIN — LEVOFLOXACIN 750 MG: 750 INJECTION, SOLUTION INTRAVENOUS at 18:02

## 2023-07-30 RX ADMIN — POTASSIUM CHLORIDE 40 MEQ: 10 CAPSULE, COATED, EXTENDED RELEASE ORAL at 22:28

## 2023-07-30 RX ADMIN — SODIUM CHLORIDE 1000 ML: 9 INJECTION, SOLUTION INTRAVENOUS at 13:53

## 2023-07-30 RX ADMIN — POTASSIUM CHLORIDE 40 MEQ: 10 CAPSULE, COATED, EXTENDED RELEASE ORAL at 18:05

## 2023-07-30 RX ADMIN — SODIUM CHLORIDE, POTASSIUM CHLORIDE, SODIUM LACTATE AND CALCIUM CHLORIDE 1000 ML: 600; 310; 30; 20 INJECTION, SOLUTION INTRAVENOUS at 15:04

## 2023-07-30 NOTE — PLAN OF CARE
Goal Outcome Evaluation:  BP improved. Arterial line and femoral line d/c'd. Abx started. Awaiting stool sample.

## 2023-07-30 NOTE — ED PROVIDER NOTES
Subjective   History of Present Illness  Patient is a 58-year-old male that presents emergency department for profuse diarrhea with an onset of Tuesday, 7/25/2023.  Patient reports that he was seen at urgent care on Friday, 7/28/2023 for same symptoms and was prescribed Lomotil and Zofran.  Wife reports that patient has been taking those as prescribed in combination with Pepto-Bismol and several other over-the-counter medications to help with swelling diarrhea. Patient reports that symptoms have slightly improved today but generalized weakness and diarrhea has continued as well as now hypotension.  Patient reports he was unable to take his blood pressure medication yesterday but did take blood pressure medication around 830 this morning.  He states that prior to taking blood pressure medication he did take his pressure and it was normal which is why he continued to take the medication.  He states following this vision became very bright and his eyes felt very sensitive to light.  This was accompanied by ringing in his left ear as well as significant hypotension and diaphoresis.  Patient does report he has recently worked with a few employees at his work that have had a GI bug.  Patient denies any other symptoms at this time.  Denies any fevers, chest pain, shortness of breath, abdominal pain, nausea or vomiting.  Patient is alert and oriented x3.  He does not appear in any distress upon physical exam and history.      Review of Systems   Constitutional:  Positive for diaphoresis and fatigue.   Eyes:  Positive for photophobia.        Denies any vision loss.  Reports vision appears very bright and he is unable to look into bright lights due to this.  Reports that this only occurred after blood pressure medication was taken and noticed he was hypotensive.   Gastrointestinal:  Positive for diarrhea. Negative for nausea and vomiting.        Diffuse diarrhea since Tuesday, 7/25/2023.  Denies nausea, vomiting, or abdominal  pain.   Skin:  Positive for pallor.   Neurological:  Positive for weakness.        Generalized weakness since 7/25/2023 due to profuse diarrhea.   All other systems reviewed and are negative.    Past Medical History:   Diagnosis Date    Diabetes mellitus     GERD (gastroesophageal reflux disease)     Hyperlipidemia     Hypertension     Seasonal allergies     TIA (transient ischemic attack)        Allergies   Allergen Reactions    Ace Inhibitors Dizziness and Cough       Past Surgical History:   Procedure Laterality Date    CHOLECYSTECTOMY      COLONOSCOPY  2002    COLONOSCOPY N/A 1/25/2019    Procedure: COLONOSCOPY WITH ANESTHESIA;  Surgeon: Jesika Mcdaniel MD;  Location: [facility] ENDOSCOPY;  Service: Gastroenterology    KNEE SURGERY      UPPER GASTROINTESTINAL ENDOSCOPY  12/29/2005       Family History   Problem Relation Age of Onset    Colon cancer Neg Hx     Colon polyps Neg Hx        Social History     Socioeconomic History    Marital status:    Tobacco Use    Smoking status: Every Day     Packs/day: 1.00     Types: Cigarettes    Smokeless tobacco: Never   Vaping Use    Vaping Use: Never used   Substance and Sexual Activity    Alcohol use: Yes    Drug use: No    Sexual activity: Defer           Objective   Physical Exam  Vitals and nursing note reviewed.   Constitutional:       Appearance: He is diaphoretic. He is not ill-appearing.   HENT:      Head: Normocephalic and atraumatic.      Right Ear: External ear normal.      Left Ear: External ear normal.      Nose: Nose normal.      Mouth/Throat:      Mouth: Mucous membranes are moist.      Pharynx: Oropharynx is clear.   Eyes:      Extraocular Movements: Extraocular movements intact.      Conjunctiva/sclera: Conjunctivae normal.      Pupils: Pupils are equal, round, and reactive to light.   Cardiovascular:      Rate and Rhythm: Regular rhythm. Tachycardia present.      Pulses: Normal pulses.      Heart sounds: Normal heart sounds.   Pulmonary:      Effort:  Pulmonary effort is normal. No respiratory distress.      Breath sounds: Normal breath sounds. No wheezing.   Chest:      Chest wall: No tenderness.   Abdominal:      General: Bowel sounds are normal. There is no distension.      Palpations: Abdomen is soft.      Tenderness: There is no abdominal tenderness. There is no right CVA tenderness, left CVA tenderness, guarding or rebound.   Musculoskeletal:         General: Normal range of motion.      Cervical back: Normal range of motion and neck supple.      Right lower leg: No edema.      Left lower leg: No edema.   Skin:     General: Skin is warm and dry.      Capillary Refill: Capillary refill takes 2 to 3 seconds.   Neurological:      General: No focal deficit present.      Mental Status: He is alert and oriented to person, place, and time. Mental status is at baseline.   Psychiatric:         Mood and Affect: Mood normal.         Behavior: Behavior normal.         Thought Content: Thought content normal.         Judgment: Judgment normal.     Labs Reviewed   COMPREHENSIVE METABOLIC PANEL - Abnormal; Notable for the following components:       Result Value    Glucose 294 (*)     BUN 39 (*)     Creatinine 1.70 (*)     Sodium 126 (*)     Potassium 2.7 (*)     Chloride 88 (*)     CO2 20.0 (*)     ALT (SGPT) 58 (*)     Anion Gap 18.0 (*)     eGFR 45.9 (*)     All other components within normal limits    Narrative:     GFR Normal >60  Chronic Kidney Disease <60  Kidney Failure <15     LIPASE - Abnormal; Notable for the following components:    Lipase 196 (*)     All other components within normal limits   LACTIC ACID, PLASMA - Abnormal; Notable for the following components:    Lactate 3.1 (*)     All other components within normal limits   CBC WITH AUTO DIFFERENTIAL - Abnormal; Notable for the following components:    RBC 5.81 (*)     RDW 11.8 (*)     RDW-SD 35.8 (*)     All other components within normal limits    Narrative:     The previously reported component NRBC is  no longer being reported. Previous result was 0.0 /100 WBC (Reference Range: 0.0-0.2 /100 WBC) on 7/30/2023 at 1307 CDT.   MANUAL DIFFERENTIAL - Abnormal; Notable for the following components:    Neutrophil % 39.0 (*)     Lymphocyte % 8.0 (*)     Monocyte % 18.0 (*)     Bands %  27.0 (*)     Metamyelocyte % 3.0 (*)     Myelocyte % 1.0 (*)     Plasma Cells % 1.0 (*)     Lymphocytes Absolute 0.64 (*)     Monocytes Absolute 1.15 (*)     All other components within normal limits   MAGNESIUM - Normal   BLOOD CULTURE   BLOOD CULTURE   URINALYSIS W/ CULTURE IF INDICATED   LACTIC ACID, REFLEX   CBC AND DIFFERENTIAL    Narrative:     The following orders were created for panel order CBC & Differential.  Procedure                               Abnormality         Status                     ---------                               -----------         ------                     CBC Auto Differential[[id number]]        Abnormal            Final result                 Please view results for these tests on the individual orders.        Insert Arterial Line    Date/Time: 7/30/2023 3:41 PM  Performed by: Devante Napoles MD  Authorized by: Devante Napoles MD     Consent:     Consent obtained:  Verbal    Consent given by:  Patient    Risks discussed:  Bleeding, ischemia, repeat procedure, infection and pain  Universal protocol:     Procedure explained and questions answered to patient or proxy's satisfaction: yes      Patient identity confirmed:  Verbally with patient  Indications:     Indications: hemodynamic monitoring    Pre-procedure details:     Skin preparation:  Chlorhexidine  Sedation:     Sedation type:  None  Anesthesia:     Anesthesia method:  None  Procedure details:     Location:  L radial    Richie's test performed: yes      Needle gauge:  20 G    Placement technique:  Ultrasound guided and Seldinger    Number of attempts:  1    Transducer: waveform confirmed    Post-procedure details:     Post-procedure:   Wrist guard applied and secured with tape    CMS:  Normal    Procedure completion:  Tolerated  Central Line At Bedside    Date/Time: 7/30/2023 3:42 PM  Performed by: Devante Napoles MD  Authorized by: Devatne Napoles MD     Consent:     Consent obtained:  Verbal    Consent given by:  Patient    Risks discussed:  Arterial puncture, incorrect placement, nerve damage and pneumothorax    Alternatives discussed:  No treatment  Universal protocol:     Patient identity confirmed:  Verbally with patient  Pre-procedure details:     Indication(s): central venous access      Hand hygiene: Hand hygiene performed prior to insertion      Sterile barrier technique: All elements of maximal sterile technique followed      Skin preparation:  Chlorhexidine    Skin preparation agent: Skin preparation agent completely dried prior to procedure    Sedation:     Sedation type:  None  Anesthesia:     Anesthesia method:  Local infiltration    Local anesthetic:  Lidocaine 1% w/o epi  Procedure details:     Location:  R femoral    Site selection rationale:  Profound hypotension, 40s-50s systolic, concern may have to start cpr during procedure so went with r femoral vein    Patient position:  Supine    Procedural supplies:  Triple lumen    Catheter size:  7 Fr    Landmarks identified: yes      Ultrasound guidance: yes      Ultrasound guidance timing: real time      Sterile ultrasound techniques: Sterile gel and sterile probe covers were used      Number of attempts:  1    Successful placement: yes    Post-procedure details:     Post-procedure:  Dressing applied    Assessment:  Blood return through all ports and free fluid flow    Procedure completion:  Tolerated           ED Course  ED Course as of 07/30/23 1546   Sun Jul 30, 2023   1544 58-year-old male presents to the ER with generalized weakness, hypotension.  Blood pressures were 50s to 80s systolic, patient was awake and talking, elevated little confused.  Artline was placed  to confirm hypotension.  Blood pressure was high 40s to low 50s systolic once Artline was placed.  Proceeded with central venous access.  Due to such low systolic blood pressures concerned patient may lose pulses so decided against internal jugular subclavian line in case patient lost pulses and compressions will need to be started.  He has acute renal failure likely secondary to hypovolemia.  Also has hyponatremia and hypokalemia.  27% bands.  Will give antibiotics and plan for admission to the ICU. [AW]      ED Course User Index  [AW] Devante Napoles MD                                           Medical Decision Making  Kvng Cedillo is a 58 y.o. male who presents to the ED for profuse diarrhea with an onset of Tuesday, 7/25/2023.  Patient reports that he was seen at urgent care on Friday, 7/28/2023 for same symptoms and was prescribed Lomotil and Zofran.  Wife reports that patient has been taking those as prescribed in combination with Pepto-Bismol and several other over-the-counter medications to help with swelling diarrhea. Patient reports that symptoms have slightly improved today but generalized weakness and diarrhea has continued as well as now hypotension.  Patient reports he was unable to take his blood pressure medication yesterday but did take blood pressure medication around 830 this morning.  He states that prior to taking blood pressure medication he did take his pressure and it was normal which is why he continued to take the medication.  He states following this vision became very bright and his eyes felt very sensitive to light.  This was accompanied by ringing in his left ear as well as significant hypotension and diaphoresis.  Patient does report he has recently worked with a few employees at his work that have had a GI bug.  Patient denies any other symptoms at this time.  Denies any fevers, chest pain, shortness of breath, abdominal pain, nausea or vomiting.  Patient is alert and  oriented x3.  He does not appear in any distress upon physical exam and history.    Patient was non-toxic appearing on arrival. No acute distress was noted. Past medical history, surgical history, and medication regimen reviewed.     Patient hypotensive and tachycardic upon arrival.  Oxygen saturations high 90s to 100% on room air without any shortness of breath or respiratory distress noted throughout ED encounter.    Previous notes, labs, imaging and more reviewed.    Patient's presentation raises suspicion for differentials including, but not limited to, acute kidney injury, C. difficile, gastroenteritis, hypovolemia, electrolyte imbalance.     Given this, Kvng was placed on the monitor and IV access was obtained as needed. Laboratory studies and imaging studies were ordered. Attending physician, Dr. Napoles was made aware of patient presentation and hypotension.  Patient care was handed off to Dr. Napoles pending any work-up, reassessment, and final disposition.     Problems Addressed:  Acute renal failure, unspecified acute renal failure type: complicated acute illness or injury  Diarrhea, unspecified type: complicated acute illness or injury  Hypokalemia: complicated acute illness or injury  Hypotension due to hypovolemia: complicated acute illness or injury  Volume depletion: complicated acute illness or injury    Amount and/or Complexity of Data Reviewed  Labs: ordered.  Radiology: ordered.  ECG/medicine tests: ordered.    Risk  Prescription drug management.  Decision regarding hospitalization.        Final diagnoses:   Acute renal failure, unspecified acute renal failure type   Diarrhea, unspecified type   Hypotension due to hypovolemia   Hypokalemia   Volume depletion       ED Disposition  ED Disposition       ED Disposition   Decision to Admit    Condition   --    Comment   Level of Care: Critical Care [6]   Diagnosis: Hypotension [083105]   Admitting Physician: RILEY MAJOR [[id number]]   Attending  Physician: RILEY MAJOR [[id number]]   Certification: I Certify That Inpatient Hospital Services Are Medically Necessary For Greater Than 2 Midnights                 No follow-up provider specified.       Medication List      No changes were made to your prescriptions during this visit.            Michael Amanda, APRN  07/30/23 1523       Devante Napoles MD  07/30/23 [id number]

## 2023-07-30 NOTE — H&P
[facility] Medicine Services  HISTORY AND PHYSICAL    Date of Admission: 7/30/2023  Primary Care Physician: Pedro Marr APRN    Subjective   Primary Historian: Patient    Chief Complaint: Dizziness, diarrhea    History of Present Illness  Mr. Cedillo is a 58 year old gentleman who resides in [place], [place].  He has a history of HTN, HLD, T2DM and GERD.  He follows with Cristian Marr for primary care support.    He presents to the ER with concerns of dizziness and diarrhea.  He started having diarrhea on Tuesday (5 days ago).  The diarrhea is quite severe.  It is watery.  He has several bowel movements per day.      He became light-headed and dizzy.  He developed blurry vision.  He felt like he was going to pass out.    In the ER, he was initially quite hypotensive.  Initial BP on arrival was 85/45.  CT abdomen and pelvis shows air fluid levels in the colon.  He is being admitted for further evaluation and management.         Review of Systems   Constitutional:  Positive for fatigue. Negative for fever.   HENT:  Negative for congestion and ear pain.    Eyes:  Negative for redness and visual disturbance.   Respiratory:  Negative for cough, shortness of breath and wheezing.    Cardiovascular:  Negative for chest pain and palpitations.   Gastrointestinal:  Positive for diarrhea. Negative for abdominal pain, nausea and vomiting.   Endocrine: Negative for cold intolerance and heat intolerance.   Genitourinary:  Negative for dysuria and frequency.   Musculoskeletal:  Negative for arthralgias and back pain.   Skin:  Negative for rash and wound.   Neurological:  Positive for dizziness, weakness and light-headedness. Negative for headaches.   Psychiatric/Behavioral:  Negative for confusion. The patient is not nervous/anxious.           Otherwise complete ROS reviewed and negative except as mentioned in the HPI.    Past Medical History:   Past Medical History:   Diagnosis Date    Diabetes  mellitus     GERD (gastroesophageal reflux disease)     Hyperlipidemia     Hypertension     Seasonal allergies     TIA (transient ischemic attack)      Past Surgical History:  Past Surgical History:   Procedure Laterality Date    CHOLECYSTECTOMY      COLONOSCOPY  2002    COLONOSCOPY N/A 1/25/2019    Procedure: COLONOSCOPY WITH ANESTHESIA;  Surgeon: Jesika Mcdaniel MD;  Location: [facility] ENDOSCOPY;  Service: Gastroenterology    KNEE SURGERY      UPPER GASTROINTESTINAL ENDOSCOPY  12/29/2005     Social History:  reports that he has been smoking cigarettes. He has been smoking an average of 1 pack per day. He has never used smokeless tobacco. He reports current alcohol use. He reports that he does not use drugs.    Family History: HTN    Allergies:  Allergies   Allergen Reactions    Ace Inhibitors Dizziness and Cough       Medications:  Prior to Admission medications    Medication Sig Start Date End Date Taking? Authorizing Provider   alfuzosin (UROXATRAL) 10 MG 24 hr tablet Take  by mouth Daily. 4/16/19   Emergency, Nurse Epic, RN   aspirin 81 MG EC tablet Take 325 mg by mouth Daily.    Emergency, Nurse Epic, RN   diphenoxylate-atropine (LOMOTIL) 2.5-0.025 MG per tablet Take 1 tablet by mouth 4 (Four) Times a Day As Needed for Diarrhea for up to 4 days. 7/28/23 8/1/23  Kvng Riojas MD   Fexofenadine HCl (MUCINEX ALLERGY PO) Take  by mouth As Needed.    Provider, MD Olive   IRBESARTAN PO Take 300 mg by mouth.    Emergency, Nurse Francoise RN   METFORMIN HCL PO Take  by mouth.    Emergency, Nurse Epic, RN   omeprazole (priLOSEC) 20 MG capsule Take 1 capsule by mouth Daily.    Provider, MD Olive   ondansetron ODT (ZOFRAN-ODT) 4 MG disintegrating tablet Place 1 tablet on the tongue Every 8 (Eight) Hours As Needed for Nausea or Vomiting for up to 4 days. 7/28/23 8/1/23  Kvng Riojas MD   PRAVASTATIN SODIUM PO Take 80 mg by mouth.    Richardson, Nurse Francoise RN   Semaglutide (Rybelsus) 7 MG tablet Take 7 mg by mouth  "Daily.    ProviderOlive MD   tadalafil (CIALIS) 5 MG tablet Take 1 tablet by mouth Daily As Needed for Erectile Dysfunction.    Provider, MD Olive     I have utilized all available immediate resources to obtain, update, or review the patient's current medications (including all prescriptions, over-the-counter products, herbals, cannabis/cannabidiol products, and vitamin/mineral/dietary (nutritional) supplements).    Objective     Vital Signs: /69   Pulse 96   Temp 96.6 øF (35.9 øC) (Oral)   Resp 20   Ht 188 cm (74\")   Wt 105 kg (232 lb 3.2 oz)   SpO2 94%   BMI 29.81 kg/mý   Physical Exam  Vitals reviewed.   Constitutional:       Appearance: He is well-developed.   HENT:      Head: Normocephalic and atraumatic.      Right Ear: External ear normal.      Left Ear: External ear normal.      Nose: Nose normal.   Eyes:      General: No scleral icterus.        Right eye: No discharge.         Left eye: No discharge.      Conjunctiva/sclera: Conjunctivae normal.      Pupils: Pupils are equal, round, and reactive to light.   Neck:      Thyroid: No thyromegaly.      Trachea: No tracheal deviation.   Cardiovascular:      Rate and Rhythm: Normal rate and regular rhythm.      Heart sounds: Normal heart sounds. No murmur heard.    No friction rub. No gallop.   Pulmonary:      Effort: Pulmonary effort is normal. No respiratory distress.      Breath sounds: Normal breath sounds. No stridor. No wheezing or rales.   Chest:      Chest wall: No tenderness.   Abdominal:      General: Bowel sounds are normal. There is no distension.      Palpations: Abdomen is soft. There is no mass.      Tenderness: There is no abdominal tenderness. There is no guarding or rebound.      Hernia: No hernia is present.   Musculoskeletal:         General: No deformity. Normal range of motion.      Cervical back: Normal range of motion and neck supple.   Lymphadenopathy:      Cervical: No cervical adenopathy.   Skin:     General: " Skin is warm and dry.      Coloration: Skin is not pale.      Findings: No erythema or rash.   Neurological:      Mental Status: He is alert and oriented to person, place, and time.      Cranial Nerves: No cranial nerve deficit.      Motor: No abnormal muscle tone.      Coordination: Coordination normal.      Deep Tendon Reflexes: Reflexes are normal and symmetric. Reflexes normal.   Psychiatric:         Behavior: Behavior normal.         Thought Content: Thought content normal.         Judgment: Judgment normal.            Results Reviewed:  Lab Results (last 24 hours)       Procedure Component Value Units Date/Time    Urinalysis With Culture If Indicated - Urine, Clean Catch [[id number]]  (Abnormal) Collected: 07/30/23 1647    Specimen: Urine, Clean Catch Updated: 07/30/23 1707     Color, UA Yellow     Appearance, UA Clear     pH, UA 5.5     Specific Gravity, UA 1.025     Glucose,  mg/dL (2+)     Ketones, UA Negative     Bilirubin, UA Negative     Blood, UA Negative     Protein, UA Trace     Leuk Esterase, UA Negative     Nitrite, UA Negative     Urobilinogen, UA 0.2 E.U./dL    Narrative:      In absence of clinical symptoms, the presence of pyuria, bacteria, and/or nitrites on the urinalysis result does not correlate with infection.  Urine microscopic not indicated.    STAT Lactic Acid, Reflex [[id number]]  (Normal) Collected: 07/30/23 1555    Specimen: Blood Updated: 07/30/23 1617     Lactate 1.7 mmol/L     Blood Culture - Blood, Arm, Left [[id number]] Collected: 07/30/23 1500    Specimen: Blood from Arm, Left Updated: 07/30/23 1527    Blood Culture - Blood, Groin, right [[id number]] Collected: 07/30/23 1500    Specimen: Blood from Groin, right Updated: 07/30/23 1526    CBC & Differential [[id number]]  (Abnormal) Collected: 07/30/23 1250    Specimen: Blood Updated: 07/30/23 1328    Narrative:      The following orders were created for panel order CBC & Differential.  Procedure                                Abnormality         Status                     ---------                               -----------         ------                     CBC Auto Differential[[id number]]        Abnormal            Final result                 Please view results for these tests on the individual orders.    CBC Auto Differential [[id number]]  (Abnormal) Collected: 07/30/23 1250    Specimen: Blood Updated: 07/30/23 1328     WBC 6.38 10*3/mm3      RBC 5.81 10*6/mm3      Hemoglobin 17.5 g/dL      Hematocrit 49.2 %      MCV 84.7 fL      MCH 30.1 pg      MCHC 35.6 g/dL      RDW 11.8 %      RDW-SD 35.8 fl      MPV 10.1 fL      Platelets 240 10*3/mm3     Narrative:      The previously reported component NRBC is no longer being reported. Previous result was 0.0 /100 WBC (Reference Range: 0.0-0.2 /100 WBC) on 7/30/2023 at 1307 CDT.    Manual Differential [[id number]]  (Abnormal) Collected: 07/30/23 1250    Specimen: Blood Updated: 07/30/23 1328     Neutrophil % 39.0 %      Lymphocyte % 8.0 %      Monocyte % 18.0 %      Eosinophil % 1.0 %      Bands %  27.0 %      Metamyelocyte % 3.0 %      Myelocyte % 1.0 %      Atypical Lymphocyte % 2.0 %      Plasma Cells % 1.0 %      Neutrophils Absolute 4.21 10*3/mm3      Lymphocytes Absolute 0.64 10*3/mm3      Monocytes Absolute 1.15 10*3/mm3      Eosinophils Absolute 0.06 10*3/mm3      Crenated RBC's Slight/1+     Microcytes Slight/1+     Poikilocytes Slight/1+     WBC Morphology Normal     Giant Platelets Slight/1+    Comprehensive Metabolic Panel [[id number]]  (Abnormal) Collected: 07/30/23 1250    Specimen: Blood Updated: 07/30/23 1321     Glucose 294 mg/dL      BUN 39 mg/dL      Creatinine 1.70 mg/dL      Sodium 126 mmol/L      Potassium 2.7 mmol/L      Chloride 88 mmol/L      CO2 20.0 mmol/L      Calcium 9.5 mg/dL      Total Protein 7.6 g/dL      Albumin 4.4 g/dL      ALT (SGPT) 58 U/L      AST (SGOT) 33 U/L      Alkaline Phosphatase 46 U/L      Total Bilirubin 0.9 mg/dL      Globulin 3.2 gm/dL      A/G  Ratio 1.4 g/dL      BUN/Creatinine Ratio 22.9     Anion Gap 18.0 mmol/L      eGFR 45.9 mL/min/1.73     Narrative:      GFR Normal >60  Chronic Kidney Disease <60  Kidney Failure <15      Lactic Acid, Plasma [[id number]]  (Abnormal) Collected: 07/30/23 1250    Specimen: Blood Updated: 07/30/23 1321     Lactate 3.1 mmol/L     Magnesium [[id number]]  (Normal) Collected: 07/30/23 1250    Specimen: Blood Updated: 07/30/23 1318     Magnesium 2.4 mg/dL     Lipase [[id number]]  (Abnormal) Collected: 07/30/23 1250    Specimen: Blood Updated: 07/30/23 1316     Lipase 196 U/L           Imaging Results (Last 24 Hours)       Procedure Component Value Units Date/Time    CT Abdomen Pelvis With Contrast [[id number]] Collected: 07/30/23 1611     Updated: 07/30/23 1621    Narrative:      CT ABDOMEN PELVIS W CONTRAST- 7/30/2023 4:01 PM CDT     HISTORY: hypotension, abd pain, diarrhea; N17.9-Acute kidney failure,  unspecified; R19.7-Diarrhea, unspecified; I95.89-Other hypotension;  E86.1-Hypovolemia; E87.6-Hypokalemia; E86.9-Volume depletion,  unspecified       COMPARISON: None.      DLP: 530 mGy cm. All CT scans are performed using dose optimization  techniques as appropriate to the performed exam and including at least  one of the following: Automated exposure control, adjustment of the mA  and/or kV according to size, and the use of the iterative reconstruction  technique.     TECHNIQUE: Following the intravenous administration of contrast, helical  CT tomographic images of the abdomen and pelvis were acquired. Coronal  reformatted images were also provided for review.      FINDINGS:   The lung bases are clear. Coronary artery calcifications are present.  There is no cardiac enlargement or pericardial effusion..      LIVER: No focal liver lesion. The hepatic vasculature is patent. There  is steatosis of the liver.     BILIARY SYSTEM: The gallbladder is surgically absent. Mild extrahepatic  biliary dilatation is likely related to  reservoir effect..      PANCREAS: No focal pancreatic lesion.      SPLEEN: Unremarkable.      KIDNEYS AND ADRENALS: The adrenals are unremarkable. There is a 5 cm  cortical cyst involving the lower pole of the right kidney. Both kidneys  otherwise demonstrate homogeneous enhancement. No perinephric fluid  collections are present. No nephrolithiasis.. The ureters are  decompressed and normal in appearance.     RETROPERITONEUM: No mass, lymphadenopathy or hemorrhage.      GI TRACT: The colon is fluid-filled and mildly distended with air-fluid  levels. Several fluid-filled bowel loops with air-fluid levels are also  present without evidence of a discrete transition point. FINDINGS would  suggest an ongoing enterocolitis with diarrhea. No focal colonic or  bowel wall thickening. No gastric outlet obstruction is present.. The  appendix is visualized and unremarkable.     OTHER: Some mildly prominent nodes are noted within the small bowel  mesentery. These may be reactive in nature. The largest measures 9 mm in  short axis. No induration of the mesentery or evidence of mass.. The  abdominopelvic vasculature is patent. The osseous structures and soft  tissues demonstrate no worrisome lesions. A fat-containing periumbilical  hernia is present.      PELVIS: A fat-containing right inguinal hernia is present. There is a  femoral line in place with the tip in the right external iliac vein..  The urinary bladder is normal in appearance.       Impression:      1. FINDINGS consistent with a diarrheal illness. There is mild fluid  distention of the colon with air-fluid levels as well as several  fluid-filled bowel loops without discrete transition point or mechanical  obstruction. No focal colonic or bowel wall thickening is present.  Scattered diverticula are noted within the left colon with no evidence  of diverticulitis.  2. A right common femoral line is in place with the tip in the right  external iliac vein. No  complication.  3. Cortical cyst lower pole right kidney.  4. The patient's undergone prior cholecystectomy.  5. Fat-containing periumbilical hernia..   6. Mild steatosis of the liver.        This report was finalized on 07/30/2023 16:17 by Dr. Alon Marcus MD.          I have personally reviewed and interpreted the radiology studies and ECG obtained at time of admission.     Assessment / Plan   Assessment:   Active Hospital Problems    Diagnosis     **Hypotension        Treatment Plan  The patient will be admitted to my service here at [facility].     1.  Dehydration  -IVF    2.  GLENN  -IVF    3.  Lactic Acidosis  -IVF    4.  Hyponatremia  -IVF    5.  Hypokalemia  -Replace K    6.  HTN  -hold BP meds given hypotension    7.  HLD  -Statin    8.  T2DM  -SSI    9.  Gastroenteritis  -Check C. Diff  -Check GI PCR  -Empiric Abx given hypotension/degree of illness    Medical Decision Making  Number and Complexity of problems: 8 problems, high complexity--GLENN and Lactic acidosis represent threat to function    Risk:  High:  decision on admission    Differential Diagnosis: Viral GE, Bacterial GE    Conditions and Status        Condition is worsening.     MDM Data  1:  Tests/Documents/Independent Historians:  A:  Prior external notes from unique source reviewed:  B.  Review of the Results of Each Unique Test:  C.  Assessment requiring an independent Historian:    2.  Independent interpretation of Tests:  A.  Radiology Interpretation:  B:  EKG/Telemetry Interpretation:    3:  Discussion of management or Test interpretation  (1) Discussed with Dr. Napoles of the ER     Care Planning  Shared decision making: patient agreeable to plan  Code status and discussions: full code    Disposition  Social Determinants of Health that impact treatment or disposition: n/a  Estimated length of stay is 2-3 days    I confirmed that the patient's advanced care plan is present, code status is documented, and a surrogate decision  maker is listed in the patient's medical record.     The patient's surrogate decision maker is his wife.     The patient was seen and examined by me on 7/30/23 at 1745.    Electronically signed by Calvin Du MD, 07/30/23, 18:03 CDT.

## 2023-07-30 NOTE — PLAN OF CARE
Goal Outcome Evaluation:  Patient is in I-7 and will move to the floor shortly after CVC and arterial line are removed.

## 2023-07-31 PROBLEM — E78.2 MIXED HYPERLIPIDEMIA: Status: ACTIVE | Noted: 2023-07-31

## 2023-07-31 PROBLEM — E11.65 TYPE 2 DIABETES MELLITUS WITH HYPERGLYCEMIA, WITHOUT LONG-TERM CURRENT USE OF INSULIN: Status: ACTIVE | Noted: 2023-07-31

## 2023-07-31 PROBLEM — E87.1 HYPONATREMIA: Status: ACTIVE | Noted: 2023-07-31

## 2023-07-31 PROBLEM — E87.20 HYPERLACTATEMIA: Status: ACTIVE | Noted: 2023-07-31

## 2023-07-31 PROBLEM — A02.0 SALMONELLA ENTERITIS: Status: ACTIVE | Noted: 2023-07-31

## 2023-07-31 PROBLEM — N17.9 ACUTE KIDNEY INJURY: Status: ACTIVE | Noted: 2023-07-31

## 2023-07-31 PROBLEM — E87.6 HYPOKALEMIA: Status: ACTIVE | Noted: 2023-07-31

## 2023-07-31 LAB
ANION GAP SERPL CALCULATED.3IONS-SCNC: 10 MMOL/L (ref 5–15)
BASOPHILS # BLD AUTO: 0.04 10*3/MM3 (ref 0–0.2)
BASOPHILS NFR BLD AUTO: 0.7 % (ref 0–1.5)
BUN SERPL-MCNC: 25 MG/DL (ref 6–20)
BUN/CREAT SERPL: 27.2 (ref 7–25)
CALCIUM SPEC-SCNC: 8.5 MG/DL (ref 8.6–10.5)
CHLORIDE SERPL-SCNC: 98 MMOL/L (ref 98–107)
CO2 SERPL-SCNC: 23 MMOL/L (ref 22–29)
CREAT SERPL-MCNC: 0.92 MG/DL (ref 0.76–1.27)
DEPRECATED RDW RBC AUTO: 37.2 FL (ref 37–54)
EGFRCR SERPLBLD CKD-EPI 2021: 95.8 ML/MIN/1.73
EOSINOPHIL # BLD AUTO: 0.06 10*3/MM3 (ref 0–0.4)
EOSINOPHIL NFR BLD AUTO: 1 % (ref 0.3–6.2)
ERYTHROCYTE [DISTWIDTH] IN BLOOD BY AUTOMATED COUNT: 11.8 % (ref 12.3–15.4)
GLUCOSE BLDC GLUCOMTR-MCNC: 171 MG/DL (ref 70–130)
GLUCOSE BLDC GLUCOMTR-MCNC: 206 MG/DL (ref 70–130)
GLUCOSE BLDC GLUCOMTR-MCNC: 222 MG/DL (ref 70–130)
GLUCOSE BLDC GLUCOMTR-MCNC: 288 MG/DL (ref 70–130)
GLUCOSE SERPL-MCNC: 164 MG/DL (ref 65–99)
HCT VFR BLD AUTO: 40.2 % (ref 37.5–51)
HGB BLD-MCNC: 14.3 G/DL (ref 13–17.7)
IMM GRANULOCYTES # BLD AUTO: 0.09 10*3/MM3 (ref 0–0.05)
IMM GRANULOCYTES NFR BLD AUTO: 1.5 % (ref 0–0.5)
LYMPHOCYTES # BLD AUTO: 0.96 10*3/MM3 (ref 0.7–3.1)
LYMPHOCYTES NFR BLD AUTO: 16.3 % (ref 19.6–45.3)
MCH RBC QN AUTO: 30.4 PG (ref 26.6–33)
MCHC RBC AUTO-ENTMCNC: 35.6 G/DL (ref 31.5–35.7)
MCV RBC AUTO: 85.4 FL (ref 79–97)
MONOCYTES # BLD AUTO: 1.12 10*3/MM3 (ref 0.1–0.9)
MONOCYTES NFR BLD AUTO: 19 % (ref 5–12)
NEUTROPHILS NFR BLD AUTO: 3.63 10*3/MM3 (ref 1.7–7)
NEUTROPHILS NFR BLD AUTO: 61.5 % (ref 42.7–76)
NRBC BLD AUTO-RTO: 0 /100 WBC (ref 0–0.2)
PLATELET # BLD AUTO: 181 10*3/MM3 (ref 140–450)
PMV BLD AUTO: 9.6 FL (ref 6–12)
POTASSIUM SERPL-SCNC: 3.3 MMOL/L (ref 3.5–5.2)
QT INTERVAL: 406 MS
QTC INTERVAL: 531 MS
RBC # BLD AUTO: 4.71 10*6/MM3 (ref 4.14–5.8)
SODIUM SERPL-SCNC: 131 MMOL/L (ref 136–145)
WBC NRBC COR # BLD: 5.9 10*3/MM3 (ref 3.4–10.8)

## 2023-07-31 PROCEDURE — 25010000002 LEVOFLOXACIN PER 250 MG: Performed by: FAMILY MEDICINE

## 2023-07-31 PROCEDURE — 85025 COMPLETE CBC W/AUTO DIFF WBC: CPT | Performed by: NURSE PRACTITIONER

## 2023-07-31 PROCEDURE — 82948 REAGENT STRIP/BLOOD GLUCOSE: CPT

## 2023-07-31 PROCEDURE — 25010000002 ENOXAPARIN PER 10 MG: Performed by: NURSE PRACTITIONER

## 2023-07-31 PROCEDURE — 63710000001 INSULIN LISPRO (HUMAN) PER 5 UNITS: Performed by: FAMILY MEDICINE

## 2023-07-31 PROCEDURE — 25010000002 METRONIDAZOLE 500 MG/100ML SOLUTION: Performed by: FAMILY MEDICINE

## 2023-07-31 PROCEDURE — 25010000002 SODIUM CHLORIDE 0.9 % WITH KCL 20 MEQ 20-0.9 MEQ/L-% SOLUTION: Performed by: NURSE PRACTITIONER

## 2023-07-31 PROCEDURE — 80048 BASIC METABOLIC PNL TOTAL CA: CPT | Performed by: FAMILY MEDICINE

## 2023-07-31 RX ORDER — AMLODIPINE BESYLATE 5 MG/1
5 TABLET ORAL DAILY
COMMUNITY

## 2023-07-31 RX ORDER — SODIUM CHLORIDE AND POTASSIUM CHLORIDE 150; 900 MG/100ML; MG/100ML
100 INJECTION, SOLUTION INTRAVENOUS CONTINUOUS
Status: DISCONTINUED | OUTPATIENT
Start: 2023-07-31 | End: 2023-08-01 | Stop reason: HOSPADM

## 2023-07-31 RX ORDER — POTASSIUM CHLORIDE 750 MG/1
40 CAPSULE, EXTENDED RELEASE ORAL ONCE
Status: COMPLETED | OUTPATIENT
Start: 2023-07-31 | End: 2023-07-31

## 2023-07-31 RX ORDER — ASPIRIN 325 MG
325 TABLET ORAL DAILY
COMMUNITY

## 2023-07-31 RX ORDER — PANTOPRAZOLE SODIUM 40 MG/1
40 TABLET, DELAYED RELEASE ORAL
Status: DISCONTINUED | OUTPATIENT
Start: 2023-08-01 | End: 2023-08-01 | Stop reason: HOSPADM

## 2023-07-31 RX ORDER — ENOXAPARIN SODIUM 100 MG/ML
40 INJECTION SUBCUTANEOUS
Status: DISCONTINUED | OUTPATIENT
Start: 2023-07-31 | End: 2023-08-01 | Stop reason: HOSPADM

## 2023-07-31 RX ORDER — AMLODIPINE BESYLATE 5 MG/1
5 TABLET ORAL
Status: DISCONTINUED | OUTPATIENT
Start: 2023-07-31 | End: 2023-08-01 | Stop reason: HOSPADM

## 2023-07-31 RX ADMIN — AMLODIPINE BESYLATE 5 MG: 5 TABLET ORAL at 18:40

## 2023-07-31 RX ADMIN — POTASSIUM CHLORIDE AND SODIUM CHLORIDE 100 ML/HR: 900; 150 INJECTION, SOLUTION INTRAVENOUS at 21:20

## 2023-07-31 RX ADMIN — LEVOFLOXACIN 750 MG: 750 INJECTION, SOLUTION INTRAVENOUS at 18:40

## 2023-07-31 RX ADMIN — SODIUM CHLORIDE 100 ML/HR: 900 INJECTION INTRAVENOUS at 03:10

## 2023-07-31 RX ADMIN — INSULIN LISPRO 4 UNITS: 100 INJECTION, SOLUTION INTRAVENOUS; SUBCUTANEOUS at 12:44

## 2023-07-31 RX ADMIN — INSULIN LISPRO 2 UNITS: 100 INJECTION, SOLUTION INTRAVENOUS; SUBCUTANEOUS at 21:18

## 2023-07-31 RX ADMIN — METRONIDAZOLE 500 MG: 500 INJECTION, SOLUTION INTRAVENOUS at 10:38

## 2023-07-31 RX ADMIN — INSULIN LISPRO 4 UNITS: 100 INJECTION, SOLUTION INTRAVENOUS; SUBCUTANEOUS at 18:00

## 2023-07-31 RX ADMIN — METRONIDAZOLE 500 MG: 500 INJECTION, SOLUTION INTRAVENOUS at 18:40

## 2023-07-31 RX ADMIN — ENOXAPARIN SODIUM 40 MG: 100 INJECTION SUBCUTANEOUS at 09:04

## 2023-07-31 RX ADMIN — POTASSIUM CHLORIDE AND SODIUM CHLORIDE 100 ML/HR: 900; 150 INJECTION, SOLUTION INTRAVENOUS at 10:38

## 2023-07-31 RX ADMIN — METRONIDAZOLE 500 MG: 500 INJECTION, SOLUTION INTRAVENOUS at 03:09

## 2023-07-31 RX ADMIN — INSULIN LISPRO 6 UNITS: 100 INJECTION, SOLUTION INTRAVENOUS; SUBCUTANEOUS at 08:44

## 2023-07-31 RX ADMIN — POTASSIUM CHLORIDE 40 MEQ: 10 CAPSULE, COATED, EXTENDED RELEASE ORAL at 09:04

## 2023-07-31 NOTE — CASE MANAGEMENT/SOCIAL WORK
Discharge Planning Assessment  [facility]     Patient Name: Kvng Cedillo  MRN: [id number]  Today's Date: 7/31/2023    Admit Date: 7/30/2023        Discharge Needs Assessment       Row Name 07/31/23 0857       Living Environment    People in Home spouse    Name(s) of People in Home Jill Cedillo - spouse    Current Living Arrangements home    Potentially Unsafe Housing Conditions none    Primary Care Provided by self    Provides Primary Care For no one    Family Caregiver if Needed child(karen), adult;spouse    Quality of Family Relationships helpful;involved;supportive    Able to Return to Prior Arrangements yes       Resource/Environmental Concerns    Resource/Environmental Concerns none    Transportation Concerns none       Food Insecurity    Within the past 12 months, you worried that your food would run out before you got the money to buy more. Never true    Within the past 12 months, the food you bought just didn't last and you didn't have money to get more. Never true       Transition Planning    Patient/Family Anticipates Transition to home with family    Patient/Family Anticipated Services at Transition none    Transportation Anticipated family or friend will provide       Discharge Needs Assessment    Readmission Within the Last 30 Days no previous admission in last 30 days    Equipment Currently Used at Home bp cuff;glucometer    Concerns to be Addressed denies needs/concerns at this time;discharge planning    Anticipated Changes Related to Illness none    Discharge Coordination/Progress Pt resides with spouse, independent at home, drives; has PCP and RX coverage, currently denies DC needs, anticipiates DC home.                   Discharge Plan    No documentation.                 Continued Care and Services - Admitted Since 7/30/2023    Coordination has not been started for this encounter.          Demographic Summary    No documentation.                  Functional Status    No documentation.                   Psychosocial    No documentation.                  Abuse/Neglect    No documentation.                  Legal    No documentation.                  Substance Abuse    No documentation.                  Patient Forms    No documentation.                     Ayla Arenas RN

## 2023-07-31 NOTE — NURSING NOTE
Pt A&Ox4 and afebrile. RA. VSS. Pt informed that we need a stool sample. Pt transferred to the floor. Safety maintained.

## 2023-07-31 NOTE — PAYOR COMM NOTE
"Marco Antonio Cedillo (58 y.o. Male)    [id number]             Admit 7/30   [facility] phone    Fax           Date of Birth   1965    Social Security Number       Address   [address] [place] [address]    Home Phone   [phone]    MRN   [id number]       Holiness   [facility]    Marital Status                               Admission Date   7/30/23    Admission Type   Emergency    Admitting Provider   Alia Crespo MD    Attending Provider   Alia Crespo MD    Department, Room/Bed   [facility] 3C, 394/1       Discharge Date       Discharge Disposition       Discharge Destination                                 Attending Provider: Alia Crespo MD    Allergies: Ace Inhibitors    Isolation: Contact   Infection: Salmonella  (07/30/23)   Code Status: CPR    Ht: 188 cm (74\")   Wt: 105 kg (232 lb 3.2 oz)    Admission Cmt: None   Principal Problem: Hypotension due to volume depletion secondary to diarrhea [I95.9]                   Active Insurance as of 7/30/2023       Primary Coverage       Payor Plan Insurance Group Employer/Plan Group    [employer] BLUE [employer]OSS [employer] BLUE CROSS BLUE [employer]IELD PPO [id number]       Payor Plan Address Payor Plan Phone Number Payor Plan Fax Number Effective Dates    [address] [phone]  11/13/2008 - None Entered    [place] [address]         Subscriber Name Subscriber Birth Date Member ID       MARCO ANTONIO CEDILLO 1965 [id number]                     Emergency Contacts        (Rel.) Home Phone Work Phone Mobile Phone    Jill Cedillo (Spouse) [phone] -- [phone]                 History & Physical        Calvin Du MD at 07/30/23 1803              [facility] Medicine Services  HISTORY AND PHYSICAL    Date of Admission: 7/30/2023  Primary Care Physician: Pedro Marr APRN    Subjective   Primary Historian: " Patient    Chief Complaint: Dizziness, diarrhea    History of Present Illness  Mr. Cedillo is a 58 year old gentleman who resides in [place], [place].  He has a history of HTN, HLD, T2DM and GERD.  He follows with Cristian Marr for primary care support.    He presents to the ER with concerns of dizziness and diarrhea.  He started having diarrhea on Tuesday (5 days ago).  The diarrhea is quite severe.  It is watery.  He has several bowel movements per day.      He became light-headed and dizzy.  He developed blurry vision.  He felt like he was going to pass out.    In the ER, he was initially quite hypotensive.  Initial BP on arrival was 85/45.  CT abdomen and pelvis shows air fluid levels in the colon.  He is being admitted for further evaluation and management.         Review of Systems   Constitutional:  Positive for fatigue. Negative for fever.   HENT:  Negative for congestion and ear pain.    Eyes:  Negative for redness and visual disturbance.   Respiratory:  Negative for cough, shortness of breath and wheezing.    Cardiovascular:  Negative for chest pain and palpitations.   Gastrointestinal:  Positive for diarrhea. Negative for abdominal pain, nausea and vomiting.   Endocrine: Negative for cold intolerance and heat intolerance.   Genitourinary:  Negative for dysuria and frequency.   Musculoskeletal:  Negative for arthralgias and back pain.   Skin:  Negative for rash and wound.   Neurological:  Positive for dizziness, weakness and light-headedness. Negative for headaches.   Psychiatric/Behavioral:  Negative for confusion. The patient is not nervous/anxious.           Otherwise complete ROS reviewed and negative except as mentioned in the HPI.    Past Medical History:   Past Medical History:   Diagnosis Date    Diabetes mellitus     GERD (gastroesophageal reflux disease)     Hyperlipidemia     Hypertension     Seasonal allergies     TIA (transient ischemic attack)      Past Surgical History:  Past Surgical History:    Procedure Laterality Date    CHOLECYSTECTOMY      COLONOSCOPY  2002    COLONOSCOPY N/A 1/25/2019    Procedure: COLONOSCOPY WITH ANESTHESIA;  Surgeon: Jesika Mcdaniel MD;  Location: [facility] ENDOSCOPY;  Service: Gastroenterology    KNEE SURGERY      UPPER GASTROINTESTINAL ENDOSCOPY  12/29/2005     Social History:  reports that he has been smoking cigarettes. He has been smoking an average of 1 pack per day. He has never used smokeless tobacco. He reports current alcohol use. He reports that he does not use drugs.    Family History: HTN    Allergies:  Allergies   Allergen Reactions    Ace Inhibitors Dizziness and Cough       Medications:  Prior to Admission medications    Medication Sig Start Date End Date Taking? Authorizing Provider   alfuzosin (UROXATRAL) 10 MG 24 hr tablet Take  by mouth Daily. 4/16/19   Emergency, Nurse Epic, RN   aspirin 81 MG EC tablet Take 325 mg by mouth Daily.    Emergency, Nurse Epic, RN   diphenoxylate-atropine (LOMOTIL) 2.5-0.025 MG per tablet Take 1 tablet by mouth 4 (Four) Times a Day As Needed for Diarrhea for up to 4 days. 7/28/23 8/1/23  Kvng Riojas MD   Fexofenadine HCl (MUCINEX ALLERGY PO) Take  by mouth As Needed.    Provider, MD Olive   IRBESARTAN PO Take 300 mg by mouth.    Emergency, Nurse RODERICK Owusu   METFORMIN HCL PO Take  by mouth.    Emergency, Nurse Epic, RN   omeprazole (priLOSEC) 20 MG capsule Take 1 capsule by mouth Daily.    Provider, MD Olive   ondansetron ODT (ZOFRAN-ODT) 4 MG disintegrating tablet Place 1 tablet on the tongue Every 8 (Eight) Hours As Needed for Nausea or Vomiting for up to 4 days. 7/28/23 8/1/23  Kvng Riojas MD   PRAVASTATIN SODIUM PO Take 80 mg by mouth.    Emergency, Nurse Francoise RN   Semaglutide (Rybelsus) 7 MG tablet Take 7 mg by mouth Daily.    ProviderOlive MD   tadalafil (CIALIS) 5 MG tablet Take 1 tablet by mouth Daily As Needed for Erectile Dysfunction.    ProviderOlive MD     I have utilized all available  "immediate resources to obtain, update, or review the patient's current medications (including all prescriptions, over-the-counter products, herbals, cannabis/cannabidiol products, and vitamin/mineral/dietary (nutritional) supplements).    Objective     Vital Signs: /69   Pulse 96   Temp 96.6 øF (35.9 øC) (Oral)   Resp 20   Ht 188 cm (74\")   Wt 105 kg (232 lb 3.2 oz)   SpO2 94%   BMI 29.81 kg/mý   Physical Exam  Vitals reviewed.   Constitutional:       Appearance: He is well-developed.   HENT:      Head: Normocephalic and atraumatic.      Right Ear: External ear normal.      Left Ear: External ear normal.      Nose: Nose normal.   Eyes:      General: No scleral icterus.        Right eye: No discharge.         Left eye: No discharge.      Conjunctiva/sclera: Conjunctivae normal.      Pupils: Pupils are equal, round, and reactive to light.   Neck:      Thyroid: No thyromegaly.      Trachea: No tracheal deviation.   Cardiovascular:      Rate and Rhythm: Normal rate and regular rhythm.      Heart sounds: Normal heart sounds. No murmur heard.    No friction rub. No gallop.   Pulmonary:      Effort: Pulmonary effort is normal. No respiratory distress.      Breath sounds: Normal breath sounds. No stridor. No wheezing or rales.   Chest:      Chest wall: No tenderness.   Abdominal:      General: Bowel sounds are normal. There is no distension.      Palpations: Abdomen is soft. There is no mass.      Tenderness: There is no abdominal tenderness. There is no guarding or rebound.      Hernia: No hernia is present.   Musculoskeletal:         General: No deformity. Normal range of motion.      Cervical back: Normal range of motion and neck supple.   Lymphadenopathy:      Cervical: No cervical adenopathy.   Skin:     General: Skin is warm and dry.      Coloration: Skin is not pale.      Findings: No erythema or rash.   Neurological:      Mental Status: He is alert and oriented to person, place, and time.      Cranial " Nerves: No cranial nerve deficit.      Motor: No abnormal muscle tone.      Coordination: Coordination normal.      Deep Tendon Reflexes: Reflexes are normal and symmetric. Reflexes normal.   Psychiatric:         Behavior: Behavior normal.         Thought Content: Thought content normal.         Judgment: Judgment normal.            Results Reviewed:  Lab Results (last 24 hours)       Procedure Component Value Units Date/Time    Urinalysis With Culture If Indicated - Urine, Clean Catch [[id number]]  (Abnormal) Collected: 07/30/23 1647    Specimen: Urine, Clean Catch Updated: 07/30/23 1707     Color, UA Yellow     Appearance, UA Clear     pH, UA 5.5     Specific Gravity, UA 1.025     Glucose,  mg/dL (2+)     Ketones, UA Negative     Bilirubin, UA Negative     Blood, UA Negative     Protein, UA Trace     Leuk Esterase, UA Negative     Nitrite, UA Negative     Urobilinogen, UA 0.2 E.U./dL    Narrative:      In absence of clinical symptoms, the presence of pyuria, bacteria, and/or nitrites on the urinalysis result does not correlate with infection.  Urine microscopic not indicated.    STAT Lactic Acid, Reflex [[id number]]  (Normal) Collected: 07/30/23 1555    Specimen: Blood Updated: 07/30/23 1617     Lactate 1.7 mmol/L     Blood Culture - Blood, Arm, Left [[id number]] Collected: 07/30/23 1500    Specimen: Blood from Arm, Left Updated: 07/30/23 1527    Blood Culture - Blood, Groin, right [[id number]] Collected: 07/30/23 1500    Specimen: Blood from Groin, right Updated: 07/30/23 1526    CBC & Differential [[id number]]  (Abnormal) Collected: 07/30/23 1250    Specimen: Blood Updated: 07/30/23 1328    Narrative:      The following orders were created for panel order CBC & Differential.  Procedure                               Abnormality         Status                     ---------                               -----------         ------                     CBC Auto Differential[[id number]]        Abnormal            Final  result                 Please view results for these tests on the individual orders.    CBC Auto Differential [[id number]]  (Abnormal) Collected: 07/30/23 1250    Specimen: Blood Updated: 07/30/23 1328     WBC 6.38 10*3/mm3      RBC 5.81 10*6/mm3      Hemoglobin 17.5 g/dL      Hematocrit 49.2 %      MCV 84.7 fL      MCH 30.1 pg      MCHC 35.6 g/dL      RDW 11.8 %      RDW-SD 35.8 fl      MPV 10.1 fL      Platelets 240 10*3/mm3     Narrative:      The previously reported component NRBC is no longer being reported. Previous result was 0.0 /100 WBC (Reference Range: 0.0-0.2 /100 WBC) on 7/30/2023 at 1307 CDT.    Manual Differential [[id number]]  (Abnormal) Collected: 07/30/23 1250    Specimen: Blood Updated: 07/30/23 1328     Neutrophil % 39.0 %      Lymphocyte % 8.0 %      Monocyte % 18.0 %      Eosinophil % 1.0 %      Bands %  27.0 %      Metamyelocyte % 3.0 %      Myelocyte % 1.0 %      Atypical Lymphocyte % 2.0 %      Plasma Cells % 1.0 %      Neutrophils Absolute 4.21 10*3/mm3      Lymphocytes Absolute 0.64 10*3/mm3      Monocytes Absolute 1.15 10*3/mm3      Eosinophils Absolute 0.06 10*3/mm3      Crenated RBC's Slight/1+     Microcytes Slight/1+     Poikilocytes Slight/1+     WBC Morphology Normal     Giant Platelets Slight/1+    Comprehensive Metabolic Panel [[id number]]  (Abnormal) Collected: 07/30/23 1250    Specimen: Blood Updated: 07/30/23 1321     Glucose 294 mg/dL      BUN 39 mg/dL      Creatinine 1.70 mg/dL      Sodium 126 mmol/L      Potassium 2.7 mmol/L      Chloride 88 mmol/L      CO2 20.0 mmol/L      Calcium 9.5 mg/dL      Total Protein 7.6 g/dL      Albumin 4.4 g/dL      ALT (SGPT) 58 U/L      AST (SGOT) 33 U/L      Alkaline Phosphatase 46 U/L      Total Bilirubin 0.9 mg/dL      Globulin 3.2 gm/dL      A/G Ratio 1.4 g/dL      BUN/Creatinine Ratio 22.9     Anion Gap 18.0 mmol/L      eGFR 45.9 mL/min/1.73     Narrative:      GFR Normal >60  Chronic Kidney Disease <60  Kidney Failure <15      Lactic Acid,  Plasma [[id number]]  (Abnormal) Collected: 07/30/23 1250    Specimen: Blood Updated: 07/30/23 1321     Lactate 3.1 mmol/L     Magnesium [[id number]]  (Normal) Collected: 07/30/23 1250    Specimen: Blood Updated: 07/30/23 1318     Magnesium 2.4 mg/dL     Lipase [[id number]]  (Abnormal) Collected: 07/30/23 1250    Specimen: Blood Updated: 07/30/23 1316     Lipase 196 U/L           Imaging Results (Last 24 Hours)       Procedure Component Value Units Date/Time    CT Abdomen Pelvis With Contrast [[id number]] Collected: 07/30/23 1611     Updated: 07/30/23 1621    Narrative:      CT ABDOMEN PELVIS W CONTRAST- 7/30/2023 4:01 PM CDT     HISTORY: hypotension, abd pain, diarrhea; N17.9-Acute kidney failure,  unspecified; R19.7-Diarrhea, unspecified; I95.89-Other hypotension;  E86.1-Hypovolemia; E87.6-Hypokalemia; E86.9-Volume depletion,  unspecified       COMPARISON: None.      DLP: 530 mGy cm. All CT scans are performed using dose optimization  techniques as appropriate to the performed exam and including at least  one of the following: Automated exposure control, adjustment of the mA  and/or kV according to size, and the use of the iterative reconstruction  technique.     TECHNIQUE: Following the intravenous administration of contrast, helical  CT tomographic images of the abdomen and pelvis were acquired. Coronal  reformatted images were also provided for review.      FINDINGS:   The lung bases are clear. Coronary artery calcifications are present.  There is no cardiac enlargement or pericardial effusion..      LIVER: No focal liver lesion. The hepatic vasculature is patent. There  is steatosis of the liver.     BILIARY SYSTEM: The gallbladder is surgically absent. Mild extrahepatic  biliary dilatation is likely related to reservoir effect..      PANCREAS: No focal pancreatic lesion.      SPLEEN: Unremarkable.      KIDNEYS AND ADRENALS: The adrenals are unremarkable. There is a 5 cm  cortical cyst involving the lower pole of  the right kidney. Both kidneys  otherwise demonstrate homogeneous enhancement. No perinephric fluid  collections are present. No nephrolithiasis.. The ureters are  decompressed and normal in appearance.     RETROPERITONEUM: No mass, lymphadenopathy or hemorrhage.      GI TRACT: The colon is fluid-filled and mildly distended with air-fluid  levels. Several fluid-filled bowel loops with air-fluid levels are also  present without evidence of a discrete transition point. FINDINGS would  suggest an ongoing enterocolitis with diarrhea. No focal colonic or  bowel wall thickening. No gastric outlet obstruction is present.. The  appendix is visualized and unremarkable.     OTHER: Some mildly prominent nodes are noted within the small bowel  mesentery. These may be reactive in nature. The largest measures 9 mm in  short axis. No induration of the mesentery or evidence of mass.. The  abdominopelvic vasculature is patent. The osseous structures and soft  tissues demonstrate no worrisome lesions. A fat-containing periumbilical  hernia is present.      PELVIS: A fat-containing right inguinal hernia is present. There is a  femoral line in place with the tip in the right external iliac vein..  The urinary bladder is normal in appearance.       Impression:      1. FINDINGS consistent with a diarrheal illness. There is mild fluid  distention of the colon with air-fluid levels as well as several  fluid-filled bowel loops without discrete transition point or mechanical  obstruction. No focal colonic or bowel wall thickening is present.  Scattered diverticula are noted within the left colon with no evidence  of diverticulitis.  2. A right common femoral line is in place with the tip in the right  external iliac vein. No complication.  3. Cortical cyst lower pole right kidney.  4. The patient's undergone prior cholecystectomy.  5. Fat-containing periumbilical hernia..   6. Mild steatosis of the liver.        This report was finalized on  07/30/2023 16:17 by Dr. Alon Marcus MD.          I have personally reviewed and interpreted the radiology studies and ECG obtained at time of admission.     Assessment / Plan   Assessment:   Active Hospital Problems    Diagnosis     **Hypotension        Treatment Plan  The patient will be admitted to my service here at [facility].     1.  Dehydration  -IVF    2.  GLENN  -IVF    3.  Lactic Acidosis  -IVF    4.  Hyponatremia  -IVF    5.  Hypokalemia  -Replace K    6.  HTN  -hold BP meds given hypotension    7.  HLD  -Statin    8.  T2DM  -SSI    9.  Gastroenteritis  -Check C. Diff  -Check GI PCR  -Empiric Abx given hypotension/degree of illness    Medical Decision Making  Number and Complexity of problems: 8 problems, high complexity--GLENN and Lactic acidosis represent threat to function    Risk:  High:  decision on admission    Differential Diagnosis: Viral GE, Bacterial GE    Conditions and Status        Condition is worsening.     MDM Data  1:  Tests/Documents/Independent Historians:  A:  Prior external notes from unique source reviewed:  B.  Review of the Results of Each Unique Test:  C.  Assessment requiring an independent Historian:    2.  Independent interpretation of Tests:  A.  Radiology Interpretation:  B:  EKG/Telemetry Interpretation:    3:  Discussion of management or Test interpretation  (1) Discussed with Dr. Napoles of the ER     Care Planning  Shared decision making: patient agreeable to plan  Code status and discussions: full code    Disposition  Social Determinants of Health that impact treatment or disposition: n/a  Estimated length of stay is 2-3 days    I confirmed that the patient's advanced care plan is present, code status is documented, and a surrogate decision maker is listed in the patient's medical record.     The patient's surrogate decision maker is his wife.     The patient was seen and examined by me on 7/30/23 at 1745.    Electronically signed by Calvin Du MD,  07/30/23, 18:03 CDT.                Electronically signed by Calvin Du MD at 07/30/23 1809          Emergency Department Notes        Devante Napoles MD at 07/30/23 1248        Procedure Orders    1. Insert Arterial Line [[id number]] ordered by Devante Napoles MD    2. Central Line At Bedside [[id number]] ordered by Devante Napoles MD                 Subjective   History of Present Illness  Patient is a 58-year-old male that presents emergency department for profuse diarrhea with an onset of Tuesday, 7/25/2023.  Patient reports that he was seen at urgent care on Friday, 7/28/2023 for same symptoms and was prescribed Lomotil and Zofran.  Wife reports that patient has been taking those as prescribed in combination with Pepto-Bismol and several other over-the-counter medications to help with swelling diarrhea. Patient reports that symptoms have slightly improved today but generalized weakness and diarrhea has continued as well as now hypotension.  Patient reports he was unable to take his blood pressure medication yesterday but did take blood pressure medication around 830 this morning.  He states that prior to taking blood pressure medication he did take his pressure and it was normal which is why he continued to take the medication.  He states following this vision became very bright and his eyes felt very sensitive to light.  This was accompanied by ringing in his left ear as well as significant hypotension and diaphoresis.  Patient does report he has recently worked with a few employees at his work that have had a GI bug.  Patient denies any other symptoms at this time.  Denies any fevers, chest pain, shortness of breath, abdominal pain, nausea or vomiting.  Patient is alert and oriented x3.  He does not appear in any distress upon physical exam and history.      Review of Systems   Constitutional:  Positive for diaphoresis and fatigue.   Eyes:  Positive for photophobia.         Denies any vision loss.  Reports vision appears very bright and he is unable to look into bright lights due to this.  Reports that this only occurred after blood pressure medication was taken and noticed he was hypotensive.   Gastrointestinal:  Positive for diarrhea. Negative for nausea and vomiting.        Diffuse diarrhea since Tuesday, 7/25/2023.  Denies nausea, vomiting, or abdominal pain.   Skin:  Positive for pallor.   Neurological:  Positive for weakness.        Generalized weakness since 7/25/2023 due to profuse diarrhea.   All other systems reviewed and are negative.    Past Medical History:   Diagnosis Date    Diabetes mellitus     GERD (gastroesophageal reflux disease)     Hyperlipidemia     Hypertension     Seasonal allergies     TIA (transient ischemic attack)        Allergies   Allergen Reactions    Ace Inhibitors Dizziness and Cough       Past Surgical History:   Procedure Laterality Date    CHOLECYSTECTOMY      COLONOSCOPY  2002    COLONOSCOPY N/A 1/25/2019    Procedure: COLONOSCOPY WITH ANESTHESIA;  Surgeon: Jesika Mcdaniel MD;  Location: [facility] ENDOSCOPY;  Service: Gastroenterology    KNEE SURGERY      UPPER GASTROINTESTINAL ENDOSCOPY  12/29/2005       Family History   Problem Relation Age of Onset    Colon cancer Neg Hx     Colon polyps Neg Hx        Social History     Socioeconomic History    Marital status:    Tobacco Use    Smoking status: Every Day     Packs/day: 1.00     Types: Cigarettes    Smokeless tobacco: Never   Vaping Use    Vaping Use: Never used   Substance and Sexual Activity    Alcohol use: Yes    Drug use: No    Sexual activity: Defer           Objective   Physical Exam  Vitals and nursing note reviewed.   Constitutional:       Appearance: He is diaphoretic. He is not ill-appearing.   HENT:      Head: Normocephalic and atraumatic.      Right Ear: External ear normal.      Left Ear: External ear normal.      Nose: Nose normal.      Mouth/Throat:      Mouth: Mucous membranes  are moist.      Pharynx: Oropharynx is clear.   Eyes:      Extraocular Movements: Extraocular movements intact.      Conjunctiva/sclera: Conjunctivae normal.      Pupils: Pupils are equal, round, and reactive to light.   Cardiovascular:      Rate and Rhythm: Regular rhythm. Tachycardia present.      Pulses: Normal pulses.      Heart sounds: Normal heart sounds.   Pulmonary:      Effort: Pulmonary effort is normal. No respiratory distress.      Breath sounds: Normal breath sounds. No wheezing.   Chest:      Chest wall: No tenderness.   Abdominal:      General: Bowel sounds are normal. There is no distension.      Palpations: Abdomen is soft.      Tenderness: There is no abdominal tenderness. There is no right CVA tenderness, left CVA tenderness, guarding or rebound.   Musculoskeletal:         General: Normal range of motion.      Cervical back: Normal range of motion and neck supple.      Right lower leg: No edema.      Left lower leg: No edema.   Skin:     General: Skin is warm and dry.      Capillary Refill: Capillary refill takes 2 to 3 seconds.   Neurological:      General: No focal deficit present.      Mental Status: He is alert and oriented to person, place, and time. Mental status is at baseline.   Psychiatric:         Mood and Affect: Mood normal.         Behavior: Behavior normal.         Thought Content: Thought content normal.         Judgment: Judgment normal.     Labs Reviewed   COMPREHENSIVE METABOLIC PANEL - Abnormal; Notable for the following components:       Result Value    Glucose 294 (*)     BUN 39 (*)     Creatinine 1.70 (*)     Sodium 126 (*)     Potassium 2.7 (*)     Chloride 88 (*)     CO2 20.0 (*)     ALT (SGPT) 58 (*)     Anion Gap 18.0 (*)     eGFR 45.9 (*)     All other components within normal limits    Narrative:     GFR Normal >60  Chronic Kidney Disease <60  Kidney Failure <15     LIPASE - Abnormal; Notable for the following components:    Lipase 196 (*)     All other components  within normal limits   LACTIC ACID, PLASMA - Abnormal; Notable for the following components:    Lactate 3.1 (*)     All other components within normal limits   CBC WITH AUTO DIFFERENTIAL - Abnormal; Notable for the following components:    RBC 5.81 (*)     RDW 11.8 (*)     RDW-SD 35.8 (*)     All other components within normal limits    Narrative:     The previously reported component NRBC is no longer being reported. Previous result was 0.0 /100 WBC (Reference Range: 0.0-0.2 /100 WBC) on 7/30/2023 at 1307 CDT.   MANUAL DIFFERENTIAL - Abnormal; Notable for the following components:    Neutrophil % 39.0 (*)     Lymphocyte % 8.0 (*)     Monocyte % 18.0 (*)     Bands %  27.0 (*)     Metamyelocyte % 3.0 (*)     Myelocyte % 1.0 (*)     Plasma Cells % 1.0 (*)     Lymphocytes Absolute 0.64 (*)     Monocytes Absolute 1.15 (*)     All other components within normal limits   MAGNESIUM - Normal   BLOOD CULTURE   BLOOD CULTURE   URINALYSIS W/ CULTURE IF INDICATED   LACTIC ACID, REFLEX   CBC AND DIFFERENTIAL    Narrative:     The following orders were created for panel order CBC & Differential.  Procedure                               Abnormality         Status                     ---------                               -----------         ------                     CBC Auto Differential[[id number]]        Abnormal            Final result                 Please view results for these tests on the individual orders.        Insert Arterial Line    Date/Time: 7/30/2023 3:41 PM  Performed by: Devante Napoles MD  Authorized by: Devante Napoles MD     Consent:     Consent obtained:  Verbal    Consent given by:  Patient    Risks discussed:  Bleeding, ischemia, repeat procedure, infection and pain  Universal protocol:     Procedure explained and questions answered to patient or proxy's satisfaction: yes      Patient identity confirmed:  Verbally with patient  Indications:     Indications: hemodynamic monitoring     Pre-procedure details:     Skin preparation:  Chlorhexidine  Sedation:     Sedation type:  None  Anesthesia:     Anesthesia method:  None  Procedure details:     Location:  L radial    Richie's test performed: yes      Needle gauge:  20 G    Placement technique:  Ultrasound guided and Seldinger    Number of attempts:  1    Transducer: waveform confirmed    Post-procedure details:     Post-procedure:  Wrist guard applied and secured with tape    CMS:  Normal    Procedure completion:  Tolerated  Central Line At Bedside    Date/Time: 7/30/2023 3:42 PM  Performed by: Devante Napoles MD  Authorized by: Devante Napoles MD     Consent:     Consent obtained:  Verbal    Consent given by:  Patient    Risks discussed:  Arterial puncture, incorrect placement, nerve damage and pneumothorax    Alternatives discussed:  No treatment  Universal protocol:     Patient identity confirmed:  Verbally with patient  Pre-procedure details:     Indication(s): central venous access      Hand hygiene: Hand hygiene performed prior to insertion      Sterile barrier technique: All elements of maximal sterile technique followed      Skin preparation:  Chlorhexidine    Skin preparation agent: Skin preparation agent completely dried prior to procedure    Sedation:     Sedation type:  None  Anesthesia:     Anesthesia method:  Local infiltration    Local anesthetic:  Lidocaine 1% w/o epi  Procedure details:     Location:  R femoral    Site selection rationale:  Profound hypotension, 40s-50s systolic, concern may have to start cpr during procedure so went with r femoral vein    Patient position:  Supine    Procedural supplies:  Triple lumen    Catheter size:  7 Fr    Landmarks identified: yes      Ultrasound guidance: yes      Ultrasound guidance timing: real time      Sterile ultrasound techniques: Sterile gel and sterile probe covers were used      Number of attempts:  1    Successful placement: yes    Post-procedure details:      Post-procedure:  Dressing applied    Assessment:  Blood return through all ports and free fluid flow    Procedure completion:  Tolerated          ED Course  ED Course as of 07/30/23 1546   Sun Jul 30, 2023   1544 58-year-old male presents to the ER with generalized weakness, hypotension.  Blood pressures were 50s to 80s systolic, patient was awake and talking, elevated little confused.  Artline was placed to confirm hypotension.  Blood pressure was high 40s to low 50s systolic once Artline was placed.  Proceeded with central venous access.  Due to such low systolic blood pressures concerned patient may lose pulses so decided against internal jugular subclavian line in case patient lost pulses and compressions will need to be started.  He has acute renal failure likely secondary to hypovolemia.  Also has hyponatremia and hypokalemia.  27% bands.  Will give antibiotics and plan for admission to the ICU. [AW]      ED Course User Index  [AW] Devante Napoles MD                                           Medical Decision Making  Kvng Cedillo is a 58 y.o. male who presents to the ED for profuse diarrhea with an onset of Tuesday, 7/25/2023.  Patient reports that he was seen at urgent care on Friday, 7/28/2023 for same symptoms and was prescribed Lomotil and Zofran.  Wife reports that patient has been taking those as prescribed in combination with Pepto-Bismol and several other over-the-counter medications to help with swelling diarrhea. Patient reports that symptoms have slightly improved today but generalized weakness and diarrhea has continued as well as now hypotension.  Patient reports he was unable to take his blood pressure medication yesterday but did take blood pressure medication around 830 this morning.  He states that prior to taking blood pressure medication he did take his pressure and it was normal which is why he continued to take the medication.  He states following this vision became very bright  and his eyes felt very sensitive to light.  This was accompanied by ringing in his left ear as well as significant hypotension and diaphoresis.  Patient does report he has recently worked with a few employees at his work that have had a GI bug.  Patient denies any other symptoms at this time.  Denies any fevers, chest pain, shortness of breath, abdominal pain, nausea or vomiting.  Patient is alert and oriented x3.  He does not appear in any distress upon physical exam and history.    Patient was non-toxic appearing on arrival. No acute distress was noted. Past medical history, surgical history, and medication regimen reviewed.     Patient hypotensive and tachycardic upon arrival.  Oxygen saturations high 90s to 100% on room air without any shortness of breath or respiratory distress noted throughout ED encounter.    Previous notes, labs, imaging and more reviewed.    Patient's presentation raises suspicion for differentials including, but not limited to, acute kidney injury, C. difficile, gastroenteritis, hypovolemia, electrolyte imbalance.     Given this, Kvng was placed on the monitor and IV access was obtained as needed. Laboratory studies and imaging studies were ordered. Attending physician, Dr. Napoles was made aware of patient presentation and hypotension.  Patient care was handed off to Dr. Napoles pending any work-up, reassessment, and final disposition.     Problems Addressed:  Acute renal failure, unspecified acute renal failure type: complicated acute illness or injury  Diarrhea, unspecified type: complicated acute illness or injury  Hypokalemia: complicated acute illness or injury  Hypotension due to hypovolemia: complicated acute illness or injury  Volume depletion: complicated acute illness or injury    Amount and/or Complexity of Data Reviewed  Labs: ordered.  Radiology: ordered.  ECG/medicine tests: ordered.    Risk  Prescription drug management.  Decision regarding hospitalization.        Final  diagnoses:   Acute renal failure, unspecified acute renal failure type   Diarrhea, unspecified type   Hypotension due to hypovolemia   Hypokalemia   Volume depletion       ED Disposition  ED Disposition       ED Disposition   Decision to Admit    Condition   --    Comment   Level of Care: Critical Care [6]   Diagnosis: Hypotension [187177]   Admitting Physician: RILEY MAJOR [[id number]]   Attending Physician: RILEY MAJOR [[id number]]   Certification: I Certify That Inpatient Hospital Services Are Medically Necessary For Greater Than 2 Midnights                 No follow-up provider specified.       Medication List      No changes were made to your prescriptions during this visit.            Michael Amanda, APRN  07/30/23 1523       Devante Napoles MD  07/30/23 1546      Electronically signed by Devante Napoles MD at 07/30/23 1546       Vital Signs (last day)       Date/Time Temp Temp src Pulse Resp BP Patient Position SpO2    07/31/23 0727 97.9 (36.6) Oral 96 16 138/76 Lying 94    07/31/23 0343 98.1 (36.7) Oral 93 16 127/70 Lying 96    07/30/23 2330 98.3 (36.8) Oral 91 16 109/65 Lying 97    07/30/23 2001 97 (36.1) Oral 99 18 132/72 Lying 96    07/30/23 1945 -- -- 102 -- -- -- --    07/30/23 1930 -- -- 98 -- 115/76 -- 95    07/30/23 1915 -- -- 105 -- 115/77 -- 97    07/30/23 1900 -- -- 99 -- 115/79 -- 96    07/30/23 1845 -- -- 96 -- 121/77 -- 98    07/30/23 1830 -- -- 96 -- 122/73 -- 96    07/30/23 1815 -- -- 88 -- 128/76 -- 96    07/30/23 1800 -- -- 90 -- 124/73 -- 95    07/30/23 1745 -- -- 93 -- 128/76 -- 97    07/30/23 1730 -- -- 91 -- 126/74 -- 94    07/30/23 1715 -- -- 96 -- 109/73 -- 94    07/30/23 1700 -- -- 96 -- 113/69 -- 94    07/30/23 1645 -- -- 92 -- 104/70 -- 94    07/30/23 1631 96.6 (35.9) Oral 94 -- 116/71 -- 98    07/30/23 1630 -- -- 95 -- -- -- --    07/30/23 1545 -- -- 91 -- -- -- 93    07/30/23 1515 -- -- 92 -- -- -- 95    07/30/23 1430 -- -- 83 -- 94/62 -- 92     07/30/23 1227 95.4 (35.2) Temporal 117 20 85/45 -- 99          Current Facility-Administered Medications   Medication Dose Route Frequency Provider Last Rate Last Admin    dextrose (D50W) (25 g/50 mL) IV injection 25 g  25 g Intravenous Q15 Min PRN Calvin Du MD        dextrose (GLUTOSE) oral gel 15 g  15 g Oral Q15 Min PRN Calvin Du MD        Enoxaparin Sodium (LOVENOX) syringe 40 mg  40 mg Subcutaneous Q24H Rosanna Naranjo APRN   40 mg at 07/31/23 0904    glucagon (human recombinant) (GLUCAGEN DIAGNOSTIC) injection 1 mg  1 mg Intramuscular Q15 Min PRN Calvin Du MD        Insulin Lispro (humaLOG) injection 2-9 Units  2-9 Units Subcutaneous 4x Daily AC & at Bedtime Calvin Du MD   6 Units at 07/31/23 0844    levoFLOXacin (LEVAQUIN) 750 mg/150 mL D5W (premix) (LEVAQUIN) 750 mg  750 mg Intravenous Q24H Calvin Du  mL/hr at 07/30/23 1802 750 mg at 07/30/23 1802    metroNIDAZOLE (FLAGYL) IVPB 500 mg  500 mg Intravenous Q8H Calvin Du  mL/hr at 07/31/23 1038 500 mg at 07/31/23 1038    sodium chloride 0.9 % flush 10 mL  10 mL Intravenous PRN Michael Amanda APRN        sodium chloride 0.9 % infusion  100 mL/hr Intravenous Continuous Calvin Du  mL/hr at 07/31/23 0310 100 mL/hr at 07/31/23 0310    sodium chloride 0.9 % with KCl 20 mEq/L infusion  100 mL/hr Intravenous Continuous Rosanna Naranjo APRN 100 mL/hr at 07/31/23 1038 100 mL/hr at 07/31/23 1038        Physician Progress Notes (last 24 hours)        Rosanna Naranjo APRN at 07/31/23 0908              [facility] Medicine Services  INPATIENT PROGRESS NOTE    Patient Name: Kvng Cedillo  Date of Admission: 7/30/2023  Today's Date: 07/31/23  Length of Stay: 1  Primary Care Physician: Pedro Marr APRN    Subjective   Chief Complaint: Diarrhea, dizziness  HPI   Mr. Cedillo presented to [facility]  emergency room 7/30/2023 with diarrhea beginning Tuesday 7/25, 5 days prior to admission associated with abdominal cramping.  Patient reported watery diarrhea with multiple stools and feeling dizzy.  Patient reported he ate Chinese food on Sunday 7/23 and then had some of the leftovers on 7/25.  Symptoms started during the night.  He reported feeling dizzy and lightheaded and felt like he was going to pass out.  Blood pressure 85/45 in ER.  CT of the abdomen showed diarrheal illness.  Mild fluid distention of the colon with air-fluid levels as well as several fluid-filled bowel loops without discrete transition point or mechanical obstruction.  No focal colonic or bowel wall thickening present.  Scattered diverticula within the left colon, no evidence of diverticulitis.  WBC 6.8, creatinine 1.7, sodium 126, potassium 2.7, lactate 3.1, magnesium 2.4.  Normal saline fluid bolus, lactated Ringer's fluid bolus, Rocephin, Flagyl given in ER.    Today  Sitting up in bed.  No oxygen in use.  Wife present in room.  Patient reports he feels better today.  No abdominal cramping.  Still with diarrhea stools improving.  Denies nausea.  No complaints of chest pain or palpitations.  Stool positive for Salmonella.  Potassium remains low.  Creatinine improved and sodium improved.  Continue IV fluids, advance diet.  Continue Flagyl and Levaquin with plans to transition to oral Cipro at discharge.  Wife tells me she ate Chinese food on Sunday but did not have leftovers as patient had on Tuesday.    Review of Systems   Constitutional:  Positive for appetite change and fatigue.   HENT:  Negative for congestion and trouble swallowing.    Eyes:  Negative for photophobia and visual disturbance.   Respiratory:  Negative for cough, shortness of breath and wheezing.    Cardiovascular:  Negative for chest pain, palpitations and leg swelling.   Gastrointestinal:  Positive for abdominal pain (Cramping) and diarrhea. Negative for vomiting.    Endocrine: Negative for cold intolerance, heat intolerance and polyuria.   Genitourinary:  Negative for dysuria, frequency and urgency.   Musculoskeletal:  Negative for gait problem.   Skin:  Negative for color change, pallor, rash and wound.   Allergic/Immunologic: Negative for immunocompromised state.   Neurological:  Positive for weakness and light-headedness.   Hematological:  Negative for adenopathy. Does not bruise/bleed easily.   Psychiatric/Behavioral:  Negative for agitation, behavioral problems and confusion.       All pertinent negatives and positives are as above. All other systems have been reviewed and are negative unless otherwise stated.     Objective    Temp:  [95.4 øF (35.2 øC)-98.3 øF (36.8 øC)] 97.9 øF (36.6 øC)  Heart Rate:  [] 96  Resp:  [16-20] 16  BP: ()/(45-79) 138/76  Physical Exam  Vitals and nursing note reviewed.   Constitutional:       Comments: Sitting up in bed.  No oxygen in use.  Wife in room.   HENT:      Head: Normocephalic and atraumatic.      Nose: No congestion.      Mouth/Throat:      Pharynx: Oropharynx is clear. No oropharyngeal exudate or posterior oropharyngeal erythema.   Eyes:      Extraocular Movements: Extraocular movements intact.      Pupils: Pupils are equal, round, and reactive to light.   Cardiovascular:      Rate and Rhythm: Normal rate.      Heart sounds: No murmur heard.  Pulmonary:      Breath sounds: No wheezing, rhonchi or rales.      Comments: No oxygen in use.  Abdominal:      General: There is no distension.      Palpations: Abdomen is soft.      Tenderness: There is abdominal tenderness (Mild tenderness lower abdomen improved).   Genitourinary:     Comments: Voiding.  Musculoskeletal:         General: No swelling or tenderness.      Cervical back: Normal range of motion and neck supple.   Skin:     General: Skin is warm and dry.   Neurological:      General: No focal deficit present.      Mental Status: He is alert and oriented to person,  place, and time.   Psychiatric:         Mood and Affect: Mood normal.         Behavior: Behavior normal.         Thought Content: Thought content normal.         Judgment: Judgment normal.     Results Review:  I have reviewed the labs, radiology results, and diagnostic studies.    Laboratory Data:   Results from last 7 days   Lab Units 07/30/23  1250   WBC 10*3/mm3 6.38   HEMOGLOBIN g/dL 17.5   HEMATOCRIT % 49.2   PLATELETS 10*3/mm3 240     Results from last 7 days   Lab Units 07/31/23  0422 07/30/23  1250   SODIUM mmol/L 131* 126*   POTASSIUM mmol/L 3.3* 2.7*   CHLORIDE mmol/L 98 88*   CO2 mmol/L 23.0 20.0*   BUN mg/dL 25* 39*   CREATININE mg/dL 0.92 1.70*   CALCIUM mg/dL 8.5* 9.5   BILIRUBIN mg/dL  --  0.9   ALK PHOS U/L  --  46   ALT (SGPT) U/L  --  58*   AST (SGOT) U/L  --  33   GLUCOSE mg/dL 164* 294*     Culture Data:   No results found for: BLOODCX, URINECX, WOUNDCX, MRSACX, RESPCX, STOOLCX      Component Value   Campylobacter Not Detected   Plesiomonas shigelloides Not Detected   Salmonella Detected Abnormal    Vibrio Not Detected   Vibrio cholerae Not Detected   Yersinia enterocolitica Not Detected   Enteroaggregative E. coli (EAEC) Not Detected   Enteropathogenic E. coli (EPEC) Not Detected   Enterotoxigenic E. coli (ETEC) lt/st Not Detected   Shiga-like toxin-producing E. coli (STEC) stx1/stx2 Not Detected   Shigella/Enteroinvasive E. coli (EIEC) Not Detected   Cryptosporidium Not Detected   Cyclospora cayetanensis Not Detected   Entamoeba histolytica Not Detected   Giardia lamblia Not Detected   Adenovirus F40/41 Not Detected   Astrovirus Not Detected   Norovirus GI/GII Not Detected   Rotavirus A Not Detected   Sapovirus (I, II, IV or V) Not Detected                   Radiology Data:   Imaging Results (Last 24 Hours)       Procedure Component Value Units Date/Time    CT Abdomen Pelvis With Contrast [[id number]] Collected: 07/30/23 1611     Updated: 07/30/23 1621    Narrative:      CT ABDOMEN PELVIS W  CONTRAST- 7/30/2023 4:01 PM CDT     HISTORY: hypotension, abd pain, diarrhea; N17.9-Acute kidney failure,  unspecified; R19.7-Diarrhea, unspecified; I95.89-Other hypotension;  E86.1-Hypovolemia; E87.6-Hypokalemia; E86.9-Volume depletion,  unspecified       COMPARISON: None.      DLP: 530 mGy cm. All CT scans are performed using dose optimization  techniques as appropriate to the performed exam and including at least  one of the following: Automated exposure control, adjustment of the mA  and/or kV according to size, and the use of the iterative reconstruction  technique.     TECHNIQUE: Following the intravenous administration of contrast, helical  CT tomographic images of the abdomen and pelvis were acquired. Coronal  reformatted images were also provided for review.      FINDINGS:   The lung bases are clear. Coronary artery calcifications are present.  There is no cardiac enlargement or pericardial effusion..      LIVER: No focal liver lesion. The hepatic vasculature is patent. There  is steatosis of the liver.     BILIARY SYSTEM: The gallbladder is surgically absent. Mild extrahepatic  biliary dilatation is likely related to reservoir effect..      PANCREAS: No focal pancreatic lesion.      SPLEEN: Unremarkable.      KIDNEYS AND ADRENALS: The adrenals are unremarkable. There is a 5 cm  cortical cyst involving the lower pole of the right kidney. Both kidneys  otherwise demonstrate homogeneous enhancement. No perinephric fluid  collections are present. No nephrolithiasis.. The ureters are  decompressed and normal in appearance.     RETROPERITONEUM: No mass, lymphadenopathy or hemorrhage.      GI TRACT: The colon is fluid-filled and mildly distended with air-fluid  levels. Several fluid-filled bowel loops with air-fluid levels are also  present without evidence of a discrete transition point. FINDINGS would  suggest an ongoing enterocolitis with diarrhea. No focal colonic or  bowel wall thickening. No gastric outlet  obstruction is present.. The  appendix is visualized and unremarkable.     OTHER: Some mildly prominent nodes are noted within the small bowel  mesentery. These may be reactive in nature. The largest measures 9 mm in  short axis. No induration of the mesentery or evidence of mass.. The  abdominopelvic vasculature is patent. The osseous structures and soft  tissues demonstrate no worrisome lesions. A fat-containing periumbilical  hernia is present.      PELVIS: A fat-containing right inguinal hernia is present. There is a  femoral line in place with the tip in the right external iliac vein..  The urinary bladder is normal in appearance.       Impression:      1. FINDINGS consistent with a diarrheal illness. There is mild fluid  distention of the colon with air-fluid levels as well as several  fluid-filled bowel loops without discrete transition point or mechanical  obstruction. No focal colonic or bowel wall thickening is present.  Scattered diverticula are noted within the left colon with no evidence  of diverticulitis.  2. A right common femoral line is in place with the tip in the right  external iliac vein. No complication.  3. Cortical cyst lower pole right kidney.  4. The patient's undergone prior cholecystectomy.  5. Fat-containing periumbilical hernia..   6. Mild steatosis of the liver.        This report was finalized on 07/30/2023 16:17 by Dr. Alon Marcus MD.            I have reviewed the patient's current medications.     Assessment/Plan   Assessment  Active Hospital Problems    Diagnosis     **Hypotension due to volume depletion secondary to diarrhea     Salmonella enteritis     Hyponatremia, clinically significant secondary to volume depletion     Hypokalemia     Acute kidney injury secondary to volume depletion due to diarrhea     Mixed hyperlipidemia     Type 2 diabetes mellitus with hyperglycemia, without long-term current use of insulin     Hyperlactatemia      Treatment Plan  1.  Hypotension  due to volume depletion and diarrhea.  Patient reported 5-day history of abdominal cramping and watery diarrhea.  Blood pressure 85/45 secondary to volume depletion.  Fluid bolus given in ER.  Continue IV fluids normal saline at 100 mL/h.  Add potassium to IV fluids.  Blood pressure improved with fluid resuscitation.    2.  Salmonella enteritis.  GI PCR panel positive for Salmonella.  Patient reported 5-day history of watery diarrhea associated with abdominal cramping.  Patient had eaten Chinese food on Sunday 7/23 and had leftovers on 7/25.    3.  Acute kidney injury secondary to volume depletion due to diarrhea.  Creatinine 1.7 on admission.  Fluid bolus given in ER.  Continue IV fluids at 100 mL/h.  Creatinine improved 0.92 today.  Repeat CMP in AM.    4.  Hyponatremia clinically significant secondary to volume depletion.  Sodium 126 on admission.  Continue IV fluids at 100 mL/h.  Sodium 131 today.  Repeat CMP in AM.    5.  Hypokalemia.  Potassium 2.7 on admission.  Potassium replaced.  Potassium 3.3 today.  Replace 40 mEq potassium orally x1 dose.  Add potassium to IV fluids.  Repeat CMP in AM.    6.  Hyperlactatemia.  Lactate 3.1.    7.  Diabetes mellitus type 2 without insulin.  Hold metformin.  Accu-Cheks with sliding scale insulin coverage.    8.  Primary hypertension presenting with hypotension.  Blood pressure 85/45 on admission.  Hold Irbesartan today due to elevated creatinine and low blood pressure on admission.  Restart tomorrow if blood pressure trends upward.    8.  Lovenox for deep vein thrombosis prophylaxis.    Medical Decision Making  Number and Complexity of problems: 8  Hypotension due to volume depletion diarrhea: Acute, high complexity  Salmonella enteritis: Acute, moderate complexity  Acute kidney injury secondary to volume depletion and diarrhea: Acute, high complexity  Hyponatremia: Acute, high complexity  Hypokalemia: Acute, high complexity  Primary hypertension presenting with  hypotension due to volume depletion: Acute on chronic high complexity  Hyperlactatemia: Acute, moderate complexity  Diabetes mellitus type 2: Chronic, moderate complexity    Differential Diagnosis: None    Conditions and Status        Condition is improving.     [facility] Data  External documents reviewed: Reviewed urgent care records visit 7/28/2023  Cardiac tracing (EKG, telemetry) interpretation: None  Radiology interpretation: Reviewed radiology interpretation CT abdomen  Labs reviewed:   CMP 7/30, BMP 7/31.  Repeat CMP in AM.  CBC 7/30/2023.  Repeat CBC in AM.  Lactate, lipase  GI PCR panel    Any tests that were considered but not ordered: None     Decision rules/scores evaluated (example [id number], Wells, etc): None     Discussed with: Dr. Heladio grullon the patient, and wife Jill     Care Planning  Shared decision making: , patient, and wife Jill.  Patient agrees to IV fluids, antibiotics.  Code status and discussions: Full code  Patient surrogate decision maker is his wife, Jill    Disposition  Social Determinants of Health that impact treatment or disposition: None  I expect the patient to be discharged to home in 1 day.     Electronically signed by LUCRECIA Chi, 07/31/23, 09:09 CDT.     Electronically signed by Rosanna Naranjo APRN at 07/31/23 1020

## 2023-07-31 NOTE — PLAN OF CARE
Goal Outcome Evaluation:  Plan of Care Reviewed With: patient        Progress: no change  Outcome Evaluation: Patient up with minimal assistance. No c/o pain at this time. IVF infusing. Contact percautions in place. Iv abx as ordered. PO K+ given.

## 2023-07-31 NOTE — PROGRESS NOTES
[facility] Medicine Services  INPATIENT PROGRESS NOTE    Patient Name: Kvng Cedillo  Date of Admission: 7/30/2023  Today's Date: 07/31/23  Length of Stay: 1  Primary Care Physician: Pedro Marr APRN    Subjective   Chief Complaint: Diarrhea, dizziness  HPI   Mr. Cedillo presented to [facility] emergency room 7/30/2023 with diarrhea beginning Tuesday 7/25, 5 days prior to admission associated with abdominal cramping.  Patient reported watery diarrhea with multiple stools and feeling dizzy.  Patient reported he ate Chinese food on Sunday 7/23 and then had some of the leftovers on 7/25.  Symptoms started during the night.  He reported feeling dizzy and lightheaded and felt like he was going to pass out.  Blood pressure 85/45 in ER.  CT of the abdomen showed diarrheal illness.  Mild fluid distention of the colon with air-fluid levels as well as several fluid-filled bowel loops without discrete transition point or mechanical obstruction.  No focal colonic or bowel wall thickening present.  Scattered diverticula within the left colon, no evidence of diverticulitis.  WBC 6.8, creatinine 1.7, sodium 126, potassium 2.7, lactate 3.1, magnesium 2.4.  Normal saline fluid bolus, lactated Ringer's fluid bolus, Rocephin, Flagyl given in ER.    Today  Sitting up in bed.  No oxygen in use.  Wife present in room.  Patient reports he feels better today.  No abdominal cramping.  Still with diarrhea stools improving.  Denies nausea.  No complaints of chest pain or palpitations.  Stool positive for Salmonella.  Potassium remains low.  Creatinine improved and sodium improved.  Continue IV fluids, advance diet.  Continue Flagyl and Levaquin with plans to transition to oral Cipro at discharge.  Wife tells me she ate Chinese food on Sunday but did not have leftovers as patient had on Tuesday.    Review of Systems   Constitutional:  Positive for appetite change and fatigue.   HENT:   Negative for congestion and trouble swallowing.    Eyes:  Negative for photophobia and visual disturbance.   Respiratory:  Negative for cough, shortness of breath and wheezing.    Cardiovascular:  Negative for chest pain, palpitations and leg swelling.   Gastrointestinal:  Positive for abdominal pain (Cramping) and diarrhea. Negative for vomiting.   Endocrine: Negative for cold intolerance, heat intolerance and polyuria.   Genitourinary:  Negative for dysuria, frequency and urgency.   Musculoskeletal:  Negative for gait problem.   Skin:  Negative for color change, pallor, rash and wound.   Allergic/Immunologic: Negative for immunocompromised state.   Neurological:  Positive for weakness and light-headedness.   Hematological:  Negative for adenopathy. Does not bruise/bleed easily.   Psychiatric/Behavioral:  Negative for agitation, behavioral problems and confusion.       All pertinent negatives and positives are as above. All other systems have been reviewed and are negative unless otherwise stated.     Objective    Temp:  [95.4 øF (35.2 øC)-98.3 øF (36.8 øC)] 97.9 øF (36.6 øC)  Heart Rate:  [] 96  Resp:  [16-20] 16  BP: ()/(45-79) 138/76  Physical Exam  Vitals and nursing note reviewed.   Constitutional:       Comments: Sitting up in bed.  No oxygen in use.  Wife in room.   HENT:      Head: Normocephalic and atraumatic.      Nose: No congestion.      Mouth/Throat:      Pharynx: Oropharynx is clear. No oropharyngeal exudate or posterior oropharyngeal erythema.   Eyes:      Extraocular Movements: Extraocular movements intact.      Pupils: Pupils are equal, round, and reactive to light.   Cardiovascular:      Rate and Rhythm: Normal rate.      Heart sounds: No murmur heard.  Pulmonary:      Breath sounds: No wheezing, rhonchi or rales.      Comments: No oxygen in use.  Abdominal:      General: There is no distension.      Palpations: Abdomen is soft.      Tenderness: There is abdominal tenderness (Mild  tenderness lower abdomen improved).   Genitourinary:     Comments: Voiding.  Musculoskeletal:         General: No swelling or tenderness.      Cervical back: Normal range of motion and neck supple.   Skin:     General: Skin is warm and dry.   Neurological:      General: No focal deficit present.      Mental Status: He is alert and oriented to person, place, and time.   Psychiatric:         Mood and Affect: Mood normal.         Behavior: Behavior normal.         Thought Content: Thought content normal.         Judgment: Judgment normal.     Results Review:  I have reviewed the labs, radiology results, and diagnostic studies.    Laboratory Data:   Results from last 7 days   Lab Units 07/30/23  1250   WBC 10*3/mm3 6.38   HEMOGLOBIN g/dL 17.5   HEMATOCRIT % 49.2   PLATELETS 10*3/mm3 240     Results from last 7 days   Lab Units 07/31/23  0422 07/30/23  1250   SODIUM mmol/L 131* 126*   POTASSIUM mmol/L 3.3* 2.7*   CHLORIDE mmol/L 98 88*   CO2 mmol/L 23.0 20.0*   BUN mg/dL 25* 39*   CREATININE mg/dL 0.92 1.70*   CALCIUM mg/dL 8.5* 9.5   BILIRUBIN mg/dL  --  0.9   ALK PHOS U/L  --  46   ALT (SGPT) U/L  --  58*   AST (SGOT) U/L  --  33   GLUCOSE mg/dL 164* 294*     Culture Data:   No results found for: BLOODCX, URINECX, WOUNDCX, MRSACX, RESPCX, STOOLCX      Component Value   Campylobacter Not Detected   Plesiomonas shigelloides Not Detected   Salmonella Detected Abnormal    Vibrio Not Detected   Vibrio cholerae Not Detected   Yersinia enterocolitica Not Detected   Enteroaggregative E. coli (EAEC) Not Detected   Enteropathogenic E. coli (EPEC) Not Detected   Enterotoxigenic E. coli (ETEC) lt/st Not Detected   Shiga-like toxin-producing E. coli (STEC) stx1/stx2 Not Detected   Shigella/Enteroinvasive E. coli (EIEC) Not Detected   Cryptosporidium Not Detected   Cyclospora cayetanensis Not Detected   Entamoeba histolytica Not Detected   Giardia lamblia Not Detected   Adenovirus F40/41 Not Detected   Astrovirus Not Detected    Norovirus GI/GII Not Detected   Rotavirus A Not Detected   Sapovirus (I, II, IV or V) Not Detected                   Radiology Data:   Imaging Results (Last 24 Hours)       Procedure Component Value Units Date/Time    CT Abdomen Pelvis With Contrast [[id number]] Collected: 07/30/23 1611     Updated: 07/30/23 1621    Narrative:      CT ABDOMEN PELVIS W CONTRAST- 7/30/2023 4:01 PM CDT     HISTORY: hypotension, abd pain, diarrhea; N17.9-Acute kidney failure,  unspecified; R19.7-Diarrhea, unspecified; I95.89-Other hypotension;  E86.1-Hypovolemia; E87.6-Hypokalemia; E86.9-Volume depletion,  unspecified       COMPARISON: None.      DLP: 530 mGy cm. All CT scans are performed using dose optimization  techniques as appropriate to the performed exam and including at least  one of the following: Automated exposure control, adjustment of the mA  and/or kV according to size, and the use of the iterative reconstruction  technique.     TECHNIQUE: Following the intravenous administration of contrast, helical  CT tomographic images of the abdomen and pelvis were acquired. Coronal  reformatted images were also provided for review.      FINDINGS:   The lung bases are clear. Coronary artery calcifications are present.  There is no cardiac enlargement or pericardial effusion..      LIVER: No focal liver lesion. The hepatic vasculature is patent. There  is steatosis of the liver.     BILIARY SYSTEM: The gallbladder is surgically absent. Mild extrahepatic  biliary dilatation is likely related to reservoir effect..      PANCREAS: No focal pancreatic lesion.      SPLEEN: Unremarkable.      KIDNEYS AND ADRENALS: The adrenals are unremarkable. There is a 5 cm  cortical cyst involving the lower pole of the right kidney. Both kidneys  otherwise demonstrate homogeneous enhancement. No perinephric fluid  collections are present. No nephrolithiasis.. The ureters are  decompressed and normal in appearance.     RETROPERITONEUM: No mass,  lymphadenopathy or hemorrhage.      GI TRACT: The colon is fluid-filled and mildly distended with air-fluid  levels. Several fluid-filled bowel loops with air-fluid levels are also  present without evidence of a discrete transition point. FINDINGS would  suggest an ongoing enterocolitis with diarrhea. No focal colonic or  bowel wall thickening. No gastric outlet obstruction is present.. The  appendix is visualized and unremarkable.     OTHER: Some mildly prominent nodes are noted within the small bowel  mesentery. These may be reactive in nature. The largest measures 9 mm in  short axis. No induration of the mesentery or evidence of mass.. The  abdominopelvic vasculature is patent. The osseous structures and soft  tissues demonstrate no worrisome lesions. A fat-containing periumbilical  hernia is present.      PELVIS: A fat-containing right inguinal hernia is present. There is a  femoral line in place with the tip in the right external iliac vein..  The urinary bladder is normal in appearance.       Impression:      1. FINDINGS consistent with a diarrheal illness. There is mild fluid  distention of the colon with air-fluid levels as well as several  fluid-filled bowel loops without discrete transition point or mechanical  obstruction. No focal colonic or bowel wall thickening is present.  Scattered diverticula are noted within the left colon with no evidence  of diverticulitis.  2. A right common femoral line is in place with the tip in the right  external iliac vein. No complication.  3. Cortical cyst lower pole right kidney.  4. The patient's undergone prior cholecystectomy.  5. Fat-containing periumbilical hernia..   6. Mild steatosis of the liver.        This report was finalized on 07/30/2023 16:17 by Dr. Alon Marcus MD.            I have reviewed the patient's current medications.     Assessment/Plan   Assessment  Active Hospital Problems    Diagnosis     **Hypotension due to volume depletion secondary  to diarrhea     Salmonella enteritis     Hyponatremia, clinically significant secondary to volume depletion     Hypokalemia     Acute kidney injury secondary to volume depletion due to diarrhea     Mixed hyperlipidemia     Type 2 diabetes mellitus with hyperglycemia, without long-term current use of insulin     Hyperlactatemia      Treatment Plan  1.  Hypotension due to volume depletion and diarrhea.  Patient reported 5-day history of abdominal cramping and watery diarrhea.  Blood pressure 85/45 secondary to volume depletion.  Fluid bolus given in ER.  Continue IV fluids normal saline at 100 mL/h.  Add potassium to IV fluids.  Blood pressure improved with fluid resuscitation.    2.  Salmonella enteritis.  GI PCR panel positive for Salmonella.  Patient reported 5-day history of watery diarrhea associated with abdominal cramping.  Patient had eaten Chinese food on Sunday 7/23 and had leftovers on 7/25.    3.  Acute kidney injury secondary to volume depletion due to diarrhea.  Creatinine 1.7 on admission.  Fluid bolus given in ER.  Continue IV fluids at 100 mL/h.  Creatinine improved 0.92 today.  Repeat CMP in AM.    4.  Hyponatremia clinically significant secondary to volume depletion.  Sodium 126 on admission.  Continue IV fluids at 100 mL/h.  Sodium 131 today.  Repeat CMP in AM.    5.  Hypokalemia.  Potassium 2.7 on admission.  Potassium replaced.  Potassium 3.3 today.  Replace 40 mEq potassium orally x1 dose.  Add potassium to IV fluids.  Repeat CMP in AM.    6.  Hyperlactatemia.  Lactate 3.1.    7.  Diabetes mellitus type 2 without insulin.  Hold metformin.  Accu-Cheks with sliding scale insulin coverage.    8.  Primary hypertension presenting with hypotension.  Blood pressure 85/45 on admission.  Hold Irbesartan today due to elevated creatinine and low blood pressure on admission.  Restart tomorrow if blood pressure trends upward.    8.  Lovenox for deep vein thrombosis prophylaxis.    Medical Decision  Making  Number and Complexity of problems: 8  Hypotension due to volume depletion diarrhea: Acute, high complexity  Salmonella enteritis: Acute, moderate complexity  Acute kidney injury secondary to volume depletion and diarrhea: Acute, high complexity  Hyponatremia: Acute, high complexity  Hypokalemia: Acute, high complexity  Primary hypertension presenting with hypotension due to volume depletion: Acute on chronic high complexity  Hyperlactatemia: Acute, moderate complexity  Diabetes mellitus type 2: Chronic, moderate complexity    Differential Diagnosis: None    Conditions and Status        Condition is improving.     [facility] Data  External documents reviewed: Reviewed urgent care records visit 7/28/2023  Cardiac tracing (EKG, telemetry) interpretation: None  Radiology interpretation: Reviewed radiology interpretation CT abdomen  Labs reviewed:   CMP 7/30, BMP 7/31.  Repeat CMP in AM.  CBC 7/30/2023.  Repeat CBC in AM.  Lactate, lipase  GI PCR panel    Any tests that were considered but not ordered: None     Decision rules/scores evaluated (example [id number], [place], etc): None     Discussed with: Dr. Heladio grullon the patient, and wife Jill     Care Planning  Shared decision making: , patient, and wife Jill.  Patient agrees to IV fluids, antibiotics.  Code status and discussions: Full code  Patient surrogate decision maker is his wifeJill    Disposition  Social Determinants of Health that impact treatment or disposition: None  I expect the patient to be discharged to home in 1 day.     Electronically signed by LUCRECIA Chi, 07/31/23, 09:09 CDT.

## 2023-08-01 VITALS
RESPIRATION RATE: 16 BRPM | TEMPERATURE: 98 F | BODY MASS INDEX: 29.8 KG/M2 | DIASTOLIC BLOOD PRESSURE: 78 MMHG | SYSTOLIC BLOOD PRESSURE: 146 MMHG | HEIGHT: 74 IN | HEART RATE: 91 BPM | OXYGEN SATURATION: 96 % | WEIGHT: 232.2 LBS

## 2023-08-01 LAB
ALBUMIN SERPL-MCNC: 3.3 G/DL (ref 3.5–5.2)
ALBUMIN/GLOB SERPL: 1.3 G/DL
ALP SERPL-CCNC: 48 U/L (ref 39–117)
ALT SERPL W P-5'-P-CCNC: 51 U/L (ref 1–41)
ANION GAP SERPL CALCULATED.3IONS-SCNC: 8 MMOL/L (ref 5–15)
ANION GAP SERPL CALCULATED.3IONS-SCNC: 8 MMOL/L (ref 5–15)
AST SERPL-CCNC: 42 U/L (ref 1–40)
BASOPHILS # BLD MANUAL: 0.12 10*3/MM3 (ref 0–0.2)
BASOPHILS NFR BLD MANUAL: 2 % (ref 0–1.5)
BILIRUB SERPL-MCNC: 0.3 MG/DL (ref 0–1.2)
BUN SERPL-MCNC: 13 MG/DL (ref 6–20)
BUN SERPL-MCNC: 15 MG/DL (ref 6–20)
BUN/CREAT SERPL: 17.6 (ref 7–25)
BUN/CREAT SERPL: 20.3 (ref 7–25)
BURR CELLS BLD QL SMEAR: ABNORMAL
CALCIUM SPEC-SCNC: 8 MG/DL (ref 8.6–10.5)
CALCIUM SPEC-SCNC: 8.5 MG/DL (ref 8.6–10.5)
CHLORIDE SERPL-SCNC: 103 MMOL/L (ref 98–107)
CHLORIDE SERPL-SCNC: 104 MMOL/L (ref 98–107)
CO2 SERPL-SCNC: 23 MMOL/L (ref 22–29)
CO2 SERPL-SCNC: 24 MMOL/L (ref 22–29)
CREAT SERPL-MCNC: 0.74 MG/DL (ref 0.76–1.27)
CREAT SERPL-MCNC: 0.74 MG/DL (ref 0.76–1.27)
DEPRECATED RDW RBC AUTO: 39.2 FL (ref 37–54)
EGFRCR SERPLBLD CKD-EPI 2021: 104.4 ML/MIN/1.73
EGFRCR SERPLBLD CKD-EPI 2021: 104.4 ML/MIN/1.73
EOSINOPHIL # BLD MANUAL: 0.24 10*3/MM3 (ref 0–0.4)
EOSINOPHIL NFR BLD MANUAL: 4 % (ref 0.3–6.2)
ERYTHROCYTE [DISTWIDTH] IN BLOOD BY AUTOMATED COUNT: 12.1 % (ref 12.3–15.4)
GIANT PLATELETS: ABNORMAL
GLOBULIN UR ELPH-MCNC: 2.5 GM/DL
GLUCOSE BLDC GLUCOMTR-MCNC: 236 MG/DL (ref 70–130)
GLUCOSE SERPL-MCNC: 151 MG/DL (ref 65–99)
GLUCOSE SERPL-MCNC: 231 MG/DL (ref 65–99)
HCT VFR BLD AUTO: 40.4 % (ref 37.5–51)
HGB BLD-MCNC: 13.6 G/DL (ref 13–17.7)
LYMPHOCYTES # BLD MANUAL: 1.44 10*3/MM3 (ref 0.7–3.1)
LYMPHOCYTES NFR BLD MANUAL: 10.1 % (ref 5–12)
MAGNESIUM SERPL-MCNC: 1.8 MG/DL (ref 1.6–2.6)
MCH RBC QN AUTO: 30 PG (ref 26.6–33)
MCHC RBC AUTO-ENTMCNC: 33.7 G/DL (ref 31.5–35.7)
MCV RBC AUTO: 89.2 FL (ref 79–97)
METAMYELOCYTES NFR BLD MANUAL: 1 % (ref 0–0)
MONOCYTES # BLD: 0.6 10*3/MM3 (ref 0.1–0.9)
NEUTROPHILS # BLD AUTO: 3.48 10*3/MM3 (ref 1.7–7)
NEUTROPHILS NFR BLD MANUAL: 52.5 % (ref 42.7–76)
NEUTS BAND NFR BLD MANUAL: 6.1 % (ref 0–5)
PLATELET # BLD AUTO: 185 10*3/MM3 (ref 140–450)
PMV BLD AUTO: 9.5 FL (ref 6–12)
POTASSIUM SERPL-SCNC: 3.1 MMOL/L (ref 3.5–5.2)
POTASSIUM SERPL-SCNC: 3.1 MMOL/L (ref 3.5–5.2)
PROT SERPL-MCNC: 5.8 G/DL (ref 6–8.5)
RBC # BLD AUTO: 4.53 10*6/MM3 (ref 4.14–5.8)
ROULEAUX BLD QL SMEAR: ABNORMAL
SODIUM SERPL-SCNC: 134 MMOL/L (ref 136–145)
SODIUM SERPL-SCNC: 136 MMOL/L (ref 136–145)
VARIANT LYMPHS NFR BLD MANUAL: 24.2 % (ref 19.6–45.3)
WBC MORPH BLD: NORMAL
WBC NRBC COR # BLD: 5.94 10*3/MM3 (ref 3.4–10.8)

## 2023-08-01 PROCEDURE — 0 POTASSIUM CHLORIDE 10 MEQ/100ML SOLUTION: Performed by: NURSE PRACTITIONER

## 2023-08-01 PROCEDURE — 63710000001 INSULIN LISPRO (HUMAN) PER 5 UNITS: Performed by: FAMILY MEDICINE

## 2023-08-01 PROCEDURE — 85007 BL SMEAR W/DIFF WBC COUNT: CPT | Performed by: NURSE PRACTITIONER

## 2023-08-01 PROCEDURE — 25010000002 METRONIDAZOLE 500 MG/100ML SOLUTION: Performed by: FAMILY MEDICINE

## 2023-08-01 PROCEDURE — 82948 REAGENT STRIP/BLOOD GLUCOSE: CPT

## 2023-08-01 PROCEDURE — 85025 COMPLETE CBC W/AUTO DIFF WBC: CPT | Performed by: NURSE PRACTITIONER

## 2023-08-01 PROCEDURE — 83735 ASSAY OF MAGNESIUM: CPT | Performed by: NURSE PRACTITIONER

## 2023-08-01 PROCEDURE — 80053 COMPREHEN METABOLIC PANEL: CPT | Performed by: NURSE PRACTITIONER

## 2023-08-01 PROCEDURE — 25010000002 ENOXAPARIN PER 10 MG: Performed by: NURSE PRACTITIONER

## 2023-08-01 RX ORDER — POTASSIUM CHLORIDE 7.45 MG/ML
10 INJECTION INTRAVENOUS
Status: DISCONTINUED | OUTPATIENT
Start: 2023-08-01 | End: 2023-08-01

## 2023-08-01 RX ORDER — POTASSIUM CHLORIDE 750 MG/1
40 CAPSULE, EXTENDED RELEASE ORAL ONCE
Status: COMPLETED | OUTPATIENT
Start: 2023-08-01 | End: 2023-08-01

## 2023-08-01 RX ORDER — CIPROFLOXACIN 500 MG/1
500 TABLET, FILM COATED ORAL EVERY 12 HOURS SCHEDULED
Status: DISCONTINUED | OUTPATIENT
Start: 2023-08-01 | End: 2023-08-01 | Stop reason: HOSPADM

## 2023-08-01 RX ORDER — CIPROFLOXACIN 500 MG/1
500 TABLET, FILM COATED ORAL EVERY 12 HOURS SCHEDULED
Qty: 10 TABLET | Refills: 0 | Status: SHIPPED | OUTPATIENT
Start: 2023-08-01 | End: 2023-08-06

## 2023-08-01 RX ADMIN — CIPROFLOXACIN 500 MG: 500 TABLET, FILM COATED ORAL at 08:58

## 2023-08-01 RX ADMIN — POTASSIUM CHLORIDE 40 MEQ: 10 CAPSULE, COATED, EXTENDED RELEASE ORAL at 08:24

## 2023-08-01 RX ADMIN — POTASSIUM CHLORIDE 40 MEQ: 10 CAPSULE, COATED, EXTENDED RELEASE ORAL at 11:51

## 2023-08-01 RX ADMIN — POTASSIUM CHLORIDE 10 MEQ: 7.46 INJECTION, SOLUTION INTRAVENOUS at 06:37

## 2023-08-01 RX ADMIN — METRONIDAZOLE 500 MG: 500 INJECTION, SOLUTION INTRAVENOUS at 02:17

## 2023-08-01 RX ADMIN — ENOXAPARIN SODIUM 40 MG: 100 INJECTION SUBCUTANEOUS at 08:24

## 2023-08-01 RX ADMIN — INSULIN LISPRO 4 UNITS: 100 INJECTION, SOLUTION INTRAVENOUS; SUBCUTANEOUS at 08:24

## 2023-08-01 RX ADMIN — POTASSIUM CHLORIDE 40 MEQ: 10 CAPSULE, COATED, EXTENDED RELEASE ORAL at 06:16

## 2023-08-01 RX ADMIN — PANTOPRAZOLE SODIUM 40 MG: 40 TABLET, DELAYED RELEASE ORAL at 06:17

## 2023-08-01 RX ADMIN — AMLODIPINE BESYLATE 5 MG: 5 TABLET ORAL at 08:23

## 2023-08-01 NOTE — PLAN OF CARE
Goal Outcome Evaluation:           Progress: improving  Outcome Evaluation: A&O. IVF. IV abx. Room air. Tele; NSR to ST this shift. Reg diet; tolerating. Multiple BMs, diarrhea continues but is less frequent. Accuchecks; SSI given per order. Safety maintained.

## 2023-08-01 NOTE — DISCHARGE SUMMARY
[facility] Medicine Services  DISCHARGE SUMMARY     Date of Admission: 7/30/2023  Date of Discharge:  8/1/2023  Primary Care Physician: Pedro Marr APRN    Presenting Problem/History of Present Illness:  Diarrhea, dizziness    Final Discharge Diagnoses:  Active Hospital Problems    Diagnosis     **Hypotension due to volume depletion secondary to diarrhea     Salmonella enteritis     Hyponatremia, clinically significant secondary to volume depletion     Hypokalemia     Acute kidney injury secondary to volume depletion due to diarrhea     Mixed hyperlipidemia     Type 2 diabetes mellitus with hyperglycemia, without long-term current use of insulin     Hyperlactatemia      Consults: None    Procedures Performed: None    Pertinent Test Results:   Results for orders placed during the hospital encounter of 12/07/22    Adult Stress Echo W/ Cont or Stress Agent if Necessary Per Protocol    Interpretation Summary    Normal functional capacity.    Clinically and electrically negative for ischemia.    Left ventricular systolic function is normal at rest with an appropriate increase in contractility with exercise.      Imaging Results (All)       Procedure Component Value Units Date/Time    CT Abdomen Pelvis With Contrast [[id number]] Collected: 07/30/23 1611     Updated: 07/30/23 1621    Narrative:      CT ABDOMEN PELVIS W CONTRAST- 7/30/2023 4:01 PM CDT     HISTORY: hypotension, abd pain, diarrhea; N17.9-Acute kidney failure,  unspecified; R19.7-Diarrhea, unspecified; I95.89-Other hypotension;  E86.1-Hypovolemia; E87.6-Hypokalemia; E86.9-Volume depletion,  unspecified       COMPARISON: None.      DLP: 530 mGy cm. All CT scans are performed using dose optimization  techniques as appropriate to the performed exam and including at least  one of the following: Automated exposure control, adjustment of the mA  and/or kV according to size, and the use of the iterative  reconstruction  technique.     TECHNIQUE: Following the intravenous administration of contrast, helical  CT tomographic images of the abdomen and pelvis were acquired. Coronal  reformatted images were also provided for review.      FINDINGS:   The lung bases are clear. Coronary artery calcifications are present.  There is no cardiac enlargement or pericardial effusion..      LIVER: No focal liver lesion. The hepatic vasculature is patent. There  is steatosis of the liver.     BILIARY SYSTEM: The gallbladder is surgically absent. Mild extrahepatic  biliary dilatation is likely related to reservoir effect..      PANCREAS: No focal pancreatic lesion.      SPLEEN: Unremarkable.      KIDNEYS AND ADRENALS: The adrenals are unremarkable. There is a 5 cm  cortical cyst involving the lower pole of the right kidney. Both kidneys  otherwise demonstrate homogeneous enhancement. No perinephric fluid  collections are present. No nephrolithiasis.. The ureters are  decompressed and normal in appearance.     RETROPERITONEUM: No mass, lymphadenopathy or hemorrhage.      GI TRACT: The colon is fluid-filled and mildly distended with air-fluid  levels. Several fluid-filled bowel loops with air-fluid levels are also  present without evidence of a discrete transition point. FINDINGS would  suggest an ongoing enterocolitis with diarrhea. No focal colonic or  bowel wall thickening. No gastric outlet obstruction is present.. The  appendix is visualized and unremarkable.     OTHER: Some mildly prominent nodes are noted within the small bowel  mesentery. These may be reactive in nature. The largest measures 9 mm in  short axis. No induration of the mesentery or evidence of mass.. The  abdominopelvic vasculature is patent. The osseous structures and soft  tissues demonstrate no worrisome lesions. A fat-containing periumbilical  hernia is present.      PELVIS: A fat-containing right inguinal hernia is present. There is a  femoral line in place  with the tip in the right external iliac vein..  The urinary bladder is normal in appearance.       Impression:      1. FINDINGS consistent with a diarrheal illness. There is mild fluid  distention of the colon with air-fluid levels as well as several  fluid-filled bowel loops without discrete transition point or mechanical  obstruction. No focal colonic or bowel wall thickening is present.  Scattered diverticula are noted within the left colon with no evidence  of diverticulitis.  2. A right common femoral line is in place with the tip in the right  external iliac vein. No complication.  3. Cortical cyst lower pole right kidney.  4. The patient's undergone prior cholecystectomy.  5. Fat-containing periumbilical hernia..   6. Mild steatosis of the liver.        This report was finalized on 07/30/2023 16:17 by Dr. Alon Marcus MD.          LAB RESULTS:      Lab 08/01/23  0603 07/31/23  1215 07/30/23  1555 07/30/23  1250   WBC 5.94 5.90  --  6.38   HEMOGLOBIN 13.6 14.3  --  17.5   HEMATOCRIT 40.4 40.2  --  49.2   PLATELETS 185 181  --  240   NEUTROS ABS 3.48 3.63  --  4.21   IMMATURE GRANS (ABS)  --  0.09*  --   --    LYMPHS ABS  --  0.96  --   --    MONOS ABS  --  1.12*  --   --    EOS ABS 0.24 0.06  --  0.06   MCV 89.2 85.4  --  84.7   LACTATE  --   --  1.7 3.1*         Lab 08/01/23  1038 08/01/23  0458 07/31/23  0422 07/30/23  1250   SODIUM 134* 136 131* 126*   POTASSIUM 3.1* 3.1* 3.3* 2.7*   CHLORIDE 103 104 98 88*   CO2 23.0 24.0 23.0 20.0*   ANION GAP 8.0 8.0 10.0 18.0*   BUN 13 15 25* 39*   CREATININE 0.74* 0.74* 0.92 1.70*   EGFR 104.4 104.4 95.8 45.9*   GLUCOSE 231* 151* 164* 294*   CALCIUM 8.5* 8.0* 8.5* 9.5   MAGNESIUM 1.8  --   --  2.4         Lab 08/01/23  0458 07/30/23  1250   TOTAL PROTEIN 5.8* 7.6   ALBUMIN 3.3* 4.4   GLOBULIN 2.5 3.2   ALT (SGPT) 51* 58*   AST (SGOT) 42* 33   BILIRUBIN 0.3 0.9   ALK PHOS 48 46   LIPASE  --  196*                     Brief Urine Lab Results  (Last result in the  past 365 days)        Color   Clarity   Blood   Leuk Est   Nitrite   Protein   CREAT   Urine HCG        07/30/23 1647 Yellow   Clear   Negative   Negative   Negative   Trace                 Microbiology Results (last 10 days)       Procedure Component Value - Date/Time    Clostridioides difficile Toxin - Stool, Per Rectum [[id number]]  (Normal) Collected: 07/30/23 2024    Lab Status: Final result Specimen: Stool from Per Rectum Updated: 07/30/23 2134    Narrative:      The following orders were created for panel order Clostridioides difficile Toxin - Stool, Per Rectum.  Procedure                               Abnormality         Status                     ---------                               -----------         ------                     Clostridioides difficile...[[id number]]  Normal              Final result                 Please view results for these tests on the individual orders.    Clostridioides difficile Toxin, PCR - Stool, Per Rectum [[id number]]  (Normal) Collected: 07/30/23 2024    Lab Status: Final result Specimen: Stool from Per Rectum Updated: 07/30/23 2134     Toxigenic C. difficile by PCR Negative    Narrative:      The result indicates the absence of toxigenic C. difficile from stool specimen.     Gastrointestinal Panel, PCR - Stool, Per Rectum [[id number]]  (Abnormal) Collected: 07/30/23 2024    Lab Status: Final result Specimen: Stool from Per Rectum Updated: 07/30/23 2228     Campylobacter Not Detected     Plesiomonas shigelloides Not Detected     Salmonella Detected     Vibrio Not Detected     Vibrio cholerae Not Detected     Yersinia enterocolitica Not Detected     Enteroaggregative E. coli (EAEC) Not Detected     Enteropathogenic E. coli (EPEC) Not Detected     Enterotoxigenic E. coli (ETEC) lt/st Not Detected     Shiga-like toxin-producing E. coli (STEC) stx1/stx2 Not Detected     Shigella/Enteroinvasive E. coli (EIEC) Not Detected     Cryptosporidium Not Detected     Cyclospora cayetanensis  Not Detected     Entamoeba histolytica Not Detected     Giardia lamblia Not Detected     Adenovirus F40/41 Not Detected     Astrovirus Not Detected     Norovirus GI/GII Not Detected     Rotavirus A Not Detected     Sapovirus (I, II, IV or V) Not Detected    Blood Culture - Blood, Groin, right [[id number]]  (Normal) Collected: 07/30/23 1500    Lab Status: Preliminary result Specimen: Blood from Groin, right Updated: 07/31/23 1531     Blood Culture No growth at 24 hours    Blood Culture - Blood, Arm, Left [[id number]]  (Normal) Collected: 07/30/23 1500    Lab Status: Preliminary result Specimen: Blood from Arm, Left Updated: 07/31/23 1531     Blood Culture No growth at 24 hours          Hospital Course: Mr. Cedillo presented to [facility] emergency room 7/30/2023 with diarrhea beginning Tuesday 7/25, 5 days prior to admission associated with abdominal cramping.  Patient reported watery diarrhea with multiple stools and feeling dizzy.  Patient reported he ate Chinese food on Sunday 7/23 and then had some of the leftovers on 7/25.  Symptoms started during the night.  He reported feeling dizzy and lightheaded and felt like he was going to pass out.  Blood pressure 85/45 in ER.  CT of the abdomen showed diarrheal illness.  Mild fluid distention of the colon with air-fluid levels as well as several fluid-filled bowel loops without discrete transition point or mechanical obstruction.  No focal colonic or bowel wall thickening present.  Scattered diverticula within the left colon, no evidence of diverticulitis.  WBC 6.8, creatinine 1.7, sodium 126, potassium 2.7, lactate 3.1, magnesium 2.4.  Normal saline fluid bolus, lactated Ringer's fluid bolus, Rocephin, Flagyl given in ER.     He was admitted to the medical floor with hypotension due to volume depletion and diarrhea.  Patient reported 5-day history of abdominal cramping and watery diarrhea.  Blood pressure 85/46 secondary to volume depletion.  Fluid bolus given  in ER.  IV fluids continued.  Blood pressure improved with IV fluid resuscitation.  Blood pressure medications held on admission due to low blood pressure.  Blood pressure improved 141/83, 146/78 at discharge and amlodipine and Avapro continued.    GI PCR panel positive for Salmonella.  Again, patient reported 5-day history of watery diarrhea associated with abdominal cramping.  Patient had eaten Chinese food on 7/23 and had leftovers on 7/25.  Diarrhea continued to improve throughout hospitalization with IV fluid hydration.  By date of discharge, patient reported last 2 bowel movements semiformed.  She was started on Levaquin and Flagyl on admission.  Per 500 mg twice daily continued at discharge.    He presented with acute kidney injury secondary to volume depletion due to diarrhea.  Creatinine 1.7 on admission.  IV fluid bolus given in ER and IV fluids continued at 100 mL/h.  Creatinine improved to 0.9 to the day after admission.  IV fluids continued.  Creatinine 0.74 on date of discharge.    Hyponatremia clinically significant secondary to volume depletion.  Sodium 126 on admission and patient received IV fluids.  Sodium improved to 131 after admission and 136 on date of discharge.    Potassium 2.7 on admission.  Potassium replaced orally and potassium was added to IV fluids.  Potassium 3.1 on 8/1.  Patient was given 40 mEq of potassium x2 doses and received IV potassium 10 mEq x 2 doses.  Follow-up potassium level 3.1 and patient received additional 40 mEq of potassium prior to discharge.  Patient has appointment with his primary care provider on 8/2/2023 with basic metabolic panel.    Hyperlactatemia.  Lactate 3.1.     Patient has history of diabetes mellitus type 2 without insulin.  Metformin held on admission.  Accu-Cheks with sliding scale insulin coverage ordered.  Glucoses 236, 151, 171.  Creatinine 0.74 and metformin may be resumed at discharge.  Hold metformin for 48 hours postcontrast.    He has a  "history of primary hypertension presenting with hypotension.  Blood pressure 85/45 on admission. Irbesartan and amlodipine held on admission.  Blood pressure improved and amlodipine resumed on 7/23.  At discharge, blood pressure 146/78, Irbesartan and amlodipine continued at discharge.    Lovenox for deep vein thrombosis prophylaxis.    On 8/1/2023, he is stable for discharge.  Patient presented with abdominal cramping and watery diarrhea for 5 days.  Creatinine 1.7 on admission and 0.74 at discharge.  GI panel positive for Salmonella.  Levaquin and Flagyl ordered on admission.  Cipro continued at discharge.  Follow-up with Cristian Marr, APRN/2/23 with basic metabolic panel.    Physical Exam on Discharge:  /78 (BP Location: Right arm, Patient Position: Lying)   Pulse 91   Temp 98 øF (36.7 øC) (Oral)   Resp 16   Ht 188 cm (74\")   Wt 105 kg (232 lb 3.2 oz)   SpO2 96%   BMI 29.81 kg/mý   Physical Exam  Vitals and nursing note reviewed.   Constitutional:       Comments: Sitting up in bed.  No oxygen in use.  No visitors in room.   HENT:      Head: Normocephalic and atraumatic.      Nose: No congestion.      Mouth/Throat:      Pharynx: Oropharynx is clear. No oropharyngeal exudate or posterior oropharyngeal erythema.   Eyes:      Extraocular Movements: Extraocular movements intact.      Pupils: Pupils are equal, round, and reactive to light.   Cardiovascular:      Rate and Rhythm: Normal rate and regular rhythm.      Heart sounds: No murmur heard.  Pulmonary:      Breath sounds: No wheezing, rhonchi or rales.      Comments: No oxygen in use.  Abdominal:      Palpations: Abdomen is soft.      Tenderness: There is no abdominal tenderness.      Comments: Patient reports last 2 bowel movements formed.   Genitourinary:     Comments: Voiding.  Musculoskeletal:         General: No swelling or tenderness.      Cervical back: Normal range of motion and neck supple.   Skin:     General: Skin is warm and dry. "   Neurological:      General: No focal deficit present.      Mental Status: He is oriented to person, place, and time.   Psychiatric:         Mood and Affect: Mood normal.         Behavior: Behavior normal.         Thought Content: Thought content normal.         Judgment: Judgment normal.     Condition on Discharge: None    Discharge Disposition:  Home or Self Care    Discharge Medications:     Discharge Medications        New Medications        Instructions Start Date   ciprofloxacin 500 MG tablet  Commonly known as: CIPRO   500 mg, Oral, Every 12 Hours Scheduled             Continue These Medications        Instructions Start Date   amLODIPine 5 MG tablet  Commonly known as: NORVASC   5 mg, Oral, Daily      aspirin 325 MG tablet   325 mg, Oral, Daily      diphenoxylate-atropine 2.5-0.025 MG per tablet  Commonly known as: LOMOTIL   1 tablet, Oral, 4 Times Daily PRN      irbesartan 300 MG tablet  Commonly known as: AVAPRO   300 mg, Oral, Daily      MUCINEX ALLERGY PO   Oral, As Needed      omeprazole 20 MG capsule  Commonly known as: priLOSEC   20 mg, Oral, Daily      ondansetron ODT 4 MG disintegrating tablet  Commonly known as: ZOFRAN-ODT   4 mg, Translingual, Every 8 Hours PRN      pravastatin 80 MG tablet  Commonly known as: PRAVACHOL   80 mg, Oral, Daily      Rybelsus 7 MG tablet  Generic drug: Semaglutide   7 mg, Oral, Daily      tadalafil 5 MG tablet  Commonly known as: CIALIS   5 mg, Oral, Daily PRN             Discharge Diet:   Diet Instructions       Diet: Regular/House Diet, Diabetic Diets; Consistent Carbohydrate; Regular Texture (IDDSI 7); Thin (IDDSI 0)      Discharge Diet:  Regular/House Diet  Diabetic Diets       Diabetic Diet: Consistent Carbohydrate    Texture: Regular Texture (IDDSI 7)    Fluid Consistency: Thin (IDDSI 0)          Activity at Discharge:   Activity Instructions       Activity as Tolerated            Discharge instructions:  1.  For worsening abdominal cramping, diarrhea seek  medical attention.  2.  Cipro 500 mg twice daily for 5 days.  3.  Maintain adequate hydration, recommend Pedialyte.  4.  Follow-up with LUCRECIA Phillips 8/2/2023 with basic metabolic panel    Follow-up Appointments:   Cristian SINGER 1 week    Test Results Pending at Discharge: None    Electronically signed by LUCRECIA Chi, 08/01/23, 12:09 CDT.    Time: 35 minutes.  Discussed with Dr. Crespo and patient.  Discussed with wife Jill on 7/31.    The above documentation resulted from a face-to-face encounter by me Rosanna SINGER, [facility].

## 2023-08-01 NOTE — PLAN OF CARE
Problem: Adult Inpatient Plan of Care  Goal: Plan of Care Review  Outcome: Ongoing, Progressing  Flowsheets (Taken 8/1/2023 0318)  Progress: improving  Plan of Care Reviewed With: patient  Outcome Evaluation: Pt with no complaints through shift. IVF NS with 20K+. IV flagyl. Tele-NSR. No reported BMs. Regular diet. Accuchecks ac/hs, 2 units given. Contact precautions for salmonella. PO K+ and IV runs--40 given, 40 still due and 2 runs still due before pt can be dc'd per Rosanna SINGER due to K+ being 3.1 this AM. No distress noted. VSS. Safety maintained.

## 2023-08-01 NOTE — PLAN OF CARE
Goal Outcome Evaluation:  Plan of Care Reviewed With: patient        Progress: improving  Outcome Evaluation: A&OX4, VSS. No c/o pain, n/v. Potassium replaced, awaiting lab results. IVF, ABX, voiding w/o difficulty, on room air. Up ad stephie, NSR on tele. ACHS accuchecks with SSI. Contact Isolation for Salmonella. Regular diet, tolerating well. D/c home today. Call light in reach, safety maintained and continue to monitor.

## 2023-08-01 NOTE — NURSING NOTE
Potassium re-draw resulted with no change. APRN notified, patient received 40mEq more and pt agreed to take. Patient is not agreeable to stay for 1400 lab draw, states his ride is here and will see his PCP tomorrow and will notify him of potassium level and total amount of p.o. and I.v. potassium that was given. APRN notified and aware.

## 2023-08-02 NOTE — PAYOR COMM NOTE
"DC HOME 8-1-23  [id number]    [id number]    Marco Antonio Cedillo (58 y.o. Male)       Date of Birth   1965    Social Security Number       Address   [address] [place] [address]    Home Phone   [phone]    MRN   [id number]       [facility]    Marital Status                               Admission Date   7/30/23    Admission Type   Emergency    Admitting Provider   Alia Crespo MD    Attending Provider       Department, Room/Bed   [facility] 3C, 394/1       Discharge Date   8/1/2023    Discharge Disposition   Home or Self Care    Discharge Destination                                 Attending Provider: (none)   Allergies: Ace Inhibitors    Isolation: None   Infection: Salmonella  (07/30/23)   Code Status: Prior    Ht: 188 cm (74\")   Wt: 105 kg (232 lb 3.2 oz)    Admission Cmt: None   Principal Problem: Hypotension due to volume depletion secondary to diarrhea [I95.9]                   Active Insurance as of 7/30/2023       Primary Coverage       Payor Plan Insurance Group Employer/Plan Group    [employer] [facility] [email or web address] [id number] PPO [id number]       Payor Plan Address Payor Plan Phone Number Payor Plan Fax Number Effective Dates    [address] [phone]  11/13/2008 - None Entered    [place] [address]         Subscriber Name Subscriber Birth Date Member ID       MARCO ANTONIO CEDILLO 1965 [id number]                     Emergency Contacts        (Rel.) Home Phone Work Phone Mobile Phone    Jill Cedillo (Spouse) [phone] -- [phone]                 Discharge Summary        Rosanna Naranjo, LUCRECIA at 08/01/23 0812       Attestation signed by Alia Crespo MD at 08/01/23 1839    I personally evaluated and examined the patient face to face in conjunction with the APC and agree with the management and disposition of the patient. My key findings are:     History: Patient has no more vomiting or " diarrhea.    PHYSICAL EXAM  Constitutional: He is oriented to person, place, and time. He appears well-developed and well-nourished.   HENT:   Head: Normocephalic and atraumatic.   Cardiovascular: Normal rate and regular rhythm.   Pulmonary/Chest: Effort normal and breath sounds normal. No stridor. No respiratory distress.   Abdominal: Soft. Bowel sounds are normal. He exhibits no distension. There is no tenderness.   Neurological: He is alert and oriented to person, place, and time.   Skin: Skin is warm and dry.   Psychiatric: He has a normal mood and affect     Plan  Discharge on oral antibiotics.                      [facility] Medicine Services  DISCHARGE SUMMARY     Date of Admission: 7/30/2023  Date of Discharge:  8/1/2023  Primary Care Physician: Pedro Marr APRN    Presenting Problem/History of Present Illness:  Diarrhea, dizziness    Final Discharge Diagnoses:  Active Hospital Problems    Diagnosis     **Hypotension due to volume depletion secondary to diarrhea     Salmonella enteritis     Hyponatremia, clinically significant secondary to volume depletion     Hypokalemia     Acute kidney injury secondary to volume depletion due to diarrhea     Mixed hyperlipidemia     Type 2 diabetes mellitus with hyperglycemia, without long-term current use of insulin     Hyperlactatemia      Consults: None    Procedures Performed: None    Pertinent Test Results:   Results for orders placed during the hospital encounter of 12/07/22    Adult Stress Echo W/ Cont or Stress Agent if Necessary Per Protocol    Interpretation Summary    Normal functional capacity.    Clinically and electrically negative for ischemia.    Left ventricular systolic function is normal at rest with an appropriate increase in contractility with exercise.      Imaging Results (All)       Procedure Component Value Units Date/Time    CT Abdomen Pelvis With Contrast [[id number]] Collected: 07/30/23 1611     Updated:  07/30/23 1621    Narrative:      CT ABDOMEN PELVIS W CONTRAST- 7/30/2023 4:01 PM CDT     HISTORY: hypotension, abd pain, diarrhea; N17.9-Acute kidney failure,  unspecified; R19.7-Diarrhea, unspecified; I95.89-Other hypotension;  E86.1-Hypovolemia; E87.6-Hypokalemia; E86.9-Volume depletion,  unspecified       COMPARISON: None.      DLP: 530 mGy cm. All CT scans are performed using dose optimization  techniques as appropriate to the performed exam and including at least  one of the following: Automated exposure control, adjustment of the mA  and/or kV according to size, and the use of the iterative reconstruction  technique.     TECHNIQUE: Following the intravenous administration of contrast, helical  CT tomographic images of the abdomen and pelvis were acquired. Coronal  reformatted images were also provided for review.      FINDINGS:   The lung bases are clear. Coronary artery calcifications are present.  There is no cardiac enlargement or pericardial effusion..      LIVER: No focal liver lesion. The hepatic vasculature is patent. There  is steatosis of the liver.     BILIARY SYSTEM: The gallbladder is surgically absent. Mild extrahepatic  biliary dilatation is likely related to reservoir effect..      PANCREAS: No focal pancreatic lesion.      SPLEEN: Unremarkable.      KIDNEYS AND ADRENALS: The adrenals are unremarkable. There is a 5 cm  cortical cyst involving the lower pole of the right kidney. Both kidneys  otherwise demonstrate homogeneous enhancement. No perinephric fluid  collections are present. No nephrolithiasis.. The ureters are  decompressed and normal in appearance.     RETROPERITONEUM: No mass, lymphadenopathy or hemorrhage.      GI TRACT: The colon is fluid-filled and mildly distended with air-fluid  levels. Several fluid-filled bowel loops with air-fluid levels are also  present without evidence of a discrete transition point. FINDINGS would  suggest an ongoing enterocolitis with diarrhea. No focal  colonic or  bowel wall thickening. No gastric outlet obstruction is present.. The  appendix is visualized and unremarkable.     OTHER: Some mildly prominent nodes are noted within the small bowel  mesentery. These may be reactive in nature. The largest measures 9 mm in  short axis. No induration of the mesentery or evidence of mass.. The  abdominopelvic vasculature is patent. The osseous structures and soft  tissues demonstrate no worrisome lesions. A fat-containing periumbilical  hernia is present.      PELVIS: A fat-containing right inguinal hernia is present. There is a  femoral line in place with the tip in the right external iliac vein..  The urinary bladder is normal in appearance.       Impression:      1. FINDINGS consistent with a diarrheal illness. There is mild fluid  distention of the colon with air-fluid levels as well as several  fluid-filled bowel loops without discrete transition point or mechanical  obstruction. No focal colonic or bowel wall thickening is present.  Scattered diverticula are noted within the left colon with no evidence  of diverticulitis.  2. A right common femoral line is in place with the tip in the right  external iliac vein. No complication.  3. Cortical cyst lower pole right kidney.  4. The patient's undergone prior cholecystectomy.  5. Fat-containing periumbilical hernia..   6. Mild steatosis of the liver.        This report was finalized on 07/30/2023 16:17 by Dr. Alon Marcus MD.          LAB RESULTS:      Lab 08/01/23  0603 07/31/23  1215 07/30/23  1555 07/30/23  1250   WBC 5.94 5.90  --  6.38   HEMOGLOBIN 13.6 14.3  --  17.5   HEMATOCRIT 40.4 40.2  --  49.2   PLATELETS 185 181  --  240   NEUTROS ABS 3.48 3.63  --  4.21   IMMATURE GRANS (ABS)  --  0.09*  --   --    LYMPHS ABS  --  0.96  --   --    MONOS ABS  --  1.12*  --   --    EOS ABS 0.24 0.06  --  0.06   MCV 89.2 85.4  --  84.7   LACTATE  --   --  1.7 3.1*         Lab 08/01/23  1038 08/01/23  0458 07/31/23  0422  07/30/23  1250   SODIUM 134* 136 131* 126*   POTASSIUM 3.1* 3.1* 3.3* 2.7*   CHLORIDE 103 104 98 88*   CO2 23.0 24.0 23.0 20.0*   ANION GAP 8.0 8.0 10.0 18.0*   BUN 13 15 25* 39*   CREATININE 0.74* 0.74* 0.92 1.70*   EGFR 104.4 104.4 95.8 45.9*   GLUCOSE 231* 151* 164* 294*   CALCIUM 8.5* 8.0* 8.5* 9.5   MAGNESIUM 1.8  --   --  2.4         Lab 08/01/23  0458 07/30/23  1250   TOTAL PROTEIN 5.8* 7.6   ALBUMIN 3.3* 4.4   GLOBULIN 2.5 3.2   ALT (SGPT) 51* 58*   AST (SGOT) 42* 33   BILIRUBIN 0.3 0.9   ALK PHOS 48 46   LIPASE  --  196*                     Brief Urine Lab Results  (Last result in the past 365 days)        Color   Clarity   Blood   Leuk Est   Nitrite   Protein   CREAT   Urine HCG        07/30/23 1647 Yellow   Clear   Negative   Negative   Negative   Trace                 Microbiology Results (last 10 days)       Procedure Component Value - Date/Time    Clostridioides difficile Toxin - Stool, Per Rectum [[id number]]  (Normal) Collected: 07/30/23 2024    Lab Status: Final result Specimen: Stool from Per Rectum Updated: 07/30/23 2134    Narrative:      The following orders were created for panel order Clostridioides difficile Toxin - Stool, Per Rectum.  Procedure                               Abnormality         Status                     ---------                               -----------         ------                     Clostridioides difficile...[[id number]]  Normal              Final result                 Please view results for these tests on the individual orders.    Clostridioides difficile Toxin, PCR - Stool, Per Rectum [[id number]]  (Normal) Collected: 07/30/23 2024    Lab Status: Final result Specimen: Stool from Per Rectum Updated: 07/30/23 2134     Toxigenic C. difficile by PCR Negative    Narrative:      The result indicates the absence of toxigenic C. difficile from stool specimen.     Gastrointestinal Panel, PCR - Stool, Per Rectum [[id number]]  (Abnormal) Collected: 07/30/23 2024    Lab Status:  Final result Specimen: Stool from Per Rectum Updated: 07/30/23 2228     Campylobacter Not Detected     Plesiomonas shigelloides Not Detected     Salmonella Detected     Vibrio Not Detected     Vibrio cholerae Not Detected     Yersinia enterocolitica Not Detected     Enteroaggregative E. coli (EAEC) Not Detected     Enteropathogenic E. coli (EPEC) Not Detected     Enterotoxigenic E. coli (ETEC) lt/st Not Detected     Shiga-like toxin-producing E. coli (STEC) stx1/stx2 Not Detected     Shigella/Enteroinvasive E. coli (EIEC) Not Detected     Cryptosporidium Not Detected     Cyclospora cayetanensis Not Detected     Entamoeba histolytica Not Detected     Giardia lamblia Not Detected     Adenovirus F40/41 Not Detected     Astrovirus Not Detected     Norovirus GI/GII Not Detected     Rotavirus A Not Detected     Sapovirus (I, II, IV or V) Not Detected    Blood Culture - Blood, Groin, right [[id number]]  (Normal) Collected: 07/30/23 1500    Lab Status: Preliminary result Specimen: Blood from Groin, right Updated: 07/31/23 1531     Blood Culture No growth at 24 hours    Blood Culture - Blood, Arm, Left [[id number]]  (Normal) Collected: 07/30/23 1500    Lab Status: Preliminary result Specimen: Blood from Arm, Left Updated: 07/31/23 1531     Blood Culture No growth at 24 hours          Hospital Course: Mr. Cedillo presented to [facility] emergency room 7/30/2023 with diarrhea beginning Tuesday 7/25, 5 days prior to admission associated with abdominal cramping.  Patient reported watery diarrhea with multiple stools and feeling dizzy.  Patient reported he ate Chinese food on Sunday 7/23 and then had some of the leftovers on 7/25.  Symptoms started during the night.  He reported feeling dizzy and lightheaded and felt like he was going to pass out.  Blood pressure 85/45 in ER.  CT of the abdomen showed diarrheal illness.  Mild fluid distention of the colon with air-fluid levels as well as several fluid-filled bowel loops  without discrete transition point or mechanical obstruction.  No focal colonic or bowel wall thickening present.  Scattered diverticula within the left colon, no evidence of diverticulitis.  WBC 6.8, creatinine 1.7, sodium 126, potassium 2.7, lactate 3.1, magnesium 2.4.  Normal saline fluid bolus, lactated Ringer's fluid bolus, Rocephin, Flagyl given in ER.     He was admitted to the medical floor with hypotension due to volume depletion and diarrhea.  Patient reported 5-day history of abdominal cramping and watery diarrhea.  Blood pressure 85/46 secondary to volume depletion.  Fluid bolus given in ER.  IV fluids continued.  Blood pressure improved with IV fluid resuscitation.  Blood pressure medications held on admission due to low blood pressure.  Blood pressure improved 141/83, 146/78 at discharge and amlodipine and Avapro continued.    GI PCR panel positive for Salmonella.  Again, patient reported 5-day history of watery diarrhea associated with abdominal cramping.  Patient had eaten Chinese food on 7/23 and had leftovers on 7/25.  Diarrhea continued to improve throughout hospitalization with IV fluid hydration.  By date of discharge, patient reported last 2 bowel movements semiformed.  She was started on Levaquin and Flagyl on admission.  Per 500 mg twice daily continued at discharge.    He presented with acute kidney injury secondary to volume depletion due to diarrhea.  Creatinine 1.7 on admission.  IV fluid bolus given in ER and IV fluids continued at 100 mL/h.  Creatinine improved to 0.9 to the day after admission.  IV fluids continued.  Creatinine 0.74 on date of discharge.    Hyponatremia clinically significant secondary to volume depletion.  Sodium 126 on admission and patient received IV fluids.  Sodium improved to 131 after admission and 136 on date of discharge.    Potassium 2.7 on admission.  Potassium replaced orally and potassium was added to IV fluids.  Potassium 3.1 on 8/1.  Patient was given 40  "mEq of potassium x2 doses and received IV potassium 10 mEq x 2 doses.  Follow-up potassium level 3.1 and patient received additional 40 mEq of potassium prior to discharge.  Patient has appointment with his primary care provider on 8/2/2023 with basic metabolic panel.    Hyperlactatemia.  Lactate 3.1.     Patient has history of diabetes mellitus type 2 without insulin.  Metformin held on admission.  Accu-Cheks with sliding scale insulin coverage ordered.  Glucoses 236, 151, 171.  Creatinine 0.74 and metformin may be resumed at discharge.  Hold metformin for 48 hours postcontrast.    He has a history of primary hypertension presenting with hypotension.  Blood pressure 85/45 on admission. Irbesartan and amlodipine held on admission.  Blood pressure improved and amlodipine resumed on 7/23.  At discharge, blood pressure 146/78, Irbesartan and amlodipine continued at discharge.    Lovenox for deep vein thrombosis prophylaxis.    On 8/1/2023, he is stable for discharge.  Patient presented with abdominal cramping and watery diarrhea for 5 days.  Creatinine 1.7 on admission and 0.74 at discharge.  GI panel positive for Salmonella.  Levaquin and Flagyl ordered on admission.  Cipro continued at discharge.  Follow-up with Cristian Marr, APRN/2/23 with basic metabolic panel.    Physical Exam on Discharge:  /78 (BP Location: Right arm, Patient Position: Lying)   Pulse 91   Temp 98 øF (36.7 øC) (Oral)   Resp 16   Ht 188 cm (74\")   Wt 105 kg (232 lb 3.2 oz)   SpO2 96%   BMI 29.81 kg/mý   Physical Exam  Vitals and nursing note reviewed.   Constitutional:       Comments: Sitting up in bed.  No oxygen in use.  No visitors in room.   HENT:      Head: Normocephalic and atraumatic.      Nose: No congestion.      Mouth/Throat:      Pharynx: Oropharynx is clear. No oropharyngeal exudate or posterior oropharyngeal erythema.   Eyes:      Extraocular Movements: Extraocular movements intact.      Pupils: Pupils are equal, round, and " reactive to light.   Cardiovascular:      Rate and Rhythm: Normal rate and regular rhythm.      Heart sounds: No murmur heard.  Pulmonary:      Breath sounds: No wheezing, rhonchi or rales.      Comments: No oxygen in use.  Abdominal:      Palpations: Abdomen is soft.      Tenderness: There is no abdominal tenderness.      Comments: Patient reports last 2 bowel movements formed.   Genitourinary:     Comments: Voiding.  Musculoskeletal:         General: No swelling or tenderness.      Cervical back: Normal range of motion and neck supple.   Skin:     General: Skin is warm and dry.   Neurological:      General: No focal deficit present.      Mental Status: He is oriented to person, place, and time.   Psychiatric:         Mood and Affect: Mood normal.         Behavior: Behavior normal.         Thought Content: Thought content normal.         Judgment: Judgment normal.     Condition on Discharge: None    Discharge Disposition:  Home or Self Care    Discharge Medications:     Discharge Medications        New Medications        Instructions Start Date   ciprofloxacin 500 MG tablet  Commonly known as: CIPRO   500 mg, Oral, Every 12 Hours Scheduled             Continue These Medications        Instructions Start Date   amLODIPine 5 MG tablet  Commonly known as: NORVASC   5 mg, Oral, Daily      aspirin 325 MG tablet   325 mg, Oral, Daily      diphenoxylate-atropine 2.5-0.025 MG per tablet  Commonly known as: LOMOTIL   1 tablet, Oral, 4 Times Daily PRN      irbesartan 300 MG tablet  Commonly known as: AVAPRO   300 mg, Oral, Daily      MUCINEX ALLERGY PO   Oral, As Needed      omeprazole 20 MG capsule  Commonly known as: priLOSEC   20 mg, Oral, Daily      ondansetron ODT 4 MG disintegrating tablet  Commonly known as: ZOFRAN-ODT   4 mg, Translingual, Every 8 Hours PRN      pravastatin 80 MG tablet  Commonly known as: PRAVACHOL   80 mg, Oral, Daily      Rybelsus 7 MG tablet  Generic drug: Semaglutide   7 mg, Oral, Daily       tadalafil 5 MG tablet  Commonly known as: CIALIS   5 mg, Oral, Daily PRN             Discharge Diet:   Diet Instructions       Diet: Regular/House Diet, Diabetic Diets; Consistent Carbohydrate; Regular Texture (IDDSI 7); Thin (IDDSI 0)      Discharge Diet:  Regular/House Diet  Diabetic Diets       Diabetic Diet: Consistent Carbohydrate    Texture: Regular Texture (IDDSI 7)    Fluid Consistency: Thin (IDDSI 0)          Activity at Discharge:   Activity Instructions       Activity as Tolerated            Discharge instructions:  1.  For worsening abdominal cramping, diarrhea seek medical attention.  2.  Cipro 500 mg twice daily for 5 days.  3.  Maintain adequate hydration, recommend Pedialyte.  4.  Follow-up with LUCRECIA Phillips 8/2/2023 with basic metabolic panel    Follow-up Appointments:   Cristian SINGER 1 week    Test Results Pending at Discharge: None    Electronically signed by LUCRECIA Chi, 08/01/23, 12:09 CDT.    Time: 35 minutes.  Discussed with Dr. Crespo and patient.  Discussed with wife Jill on 7/31.    The above documentation resulted from a face-to-face encounter by me Rosanna SINGER, [facility].           Electronically signed by Alia Crespo MD at 08/01/23 4446

## 2023-08-04 LAB
BACTERIA SPEC AEROBE CULT: NORMAL
BACTERIA SPEC AEROBE CULT: NORMAL

## 2023-08-07 LAB
ADV 40+41 DNA STL QL NAA+NON-PROBE: NOT DETECTED
ASTRO TYP 1-8 RNA STL QL NAA+NON-PROBE: NOT DETECTED
C CAYETANENSIS DNA STL QL NAA+NON-PROBE: NOT DETECTED
C COLI+JEJ+UPSA DNA STL QL NAA+NON-PROBE: NOT DETECTED
CRYPTOSP DNA STL QL NAA+NON-PROBE: NOT DETECTED
E HISTOLYT DNA STL QL NAA+NON-PROBE: NOT DETECTED
EAEC PAA PLAS AGGR+AATA ST NAA+NON-PRB: NOT DETECTED
EC STX1+STX2 GENES STL QL NAA+NON-PROBE: NOT DETECTED
EPEC EAE GENE STL QL NAA+NON-PROBE: NOT DETECTED
ETEC LTA+ST1A+ST1B TOX ST NAA+NON-PROBE: NOT DETECTED
G LAMBLIA DNA STL QL NAA+NON-PROBE: NOT DETECTED
NOROVIRUS GI+II RNA STL QL NAA+NON-PROBE: NOT DETECTED
P SHIGELLOIDES DNA STL QL NAA+NON-PROBE: NOT DETECTED
RVA RNA STL QL NAA+NON-PROBE: NOT DETECTED
S ENT+BONG DNA STL QL NAA+NON-PROBE: DETECTED
SAPO I+II+IV+V RNA STL QL NAA+NON-PROBE: NOT DETECTED
SHIGELLA SP+EIEC IPAH ST NAA+NON-PROBE: NOT DETECTED
V CHOL+PARA+VUL DNA STL QL NAA+NON-PROBE: NOT DETECTED
V CHOLERAE DNA STL QL NAA+NON-PROBE: NOT DETECTED
Y ENTEROCOL DNA STL QL NAA+NON-PROBE: NOT DETECTED

## 2024-04-03 ENCOUNTER — TELEPHONE (OUTPATIENT)
Dept: GASTROENTEROLOGY | Facility: CLINIC | Age: 59
End: 2024-04-03
Payer: COMMERCIAL

## 2024-11-04 ENCOUNTER — TELEPHONE (OUTPATIENT)
Age: 59
End: 2024-11-04

## 2024-11-04 ENCOUNTER — OFFICE VISIT (OUTPATIENT)
Age: 59
End: 2024-11-04
Payer: COMMERCIAL

## 2024-11-04 VITALS — WEIGHT: 255 LBS | HEIGHT: 74 IN | BODY MASS INDEX: 32.73 KG/M2

## 2024-11-04 DIAGNOSIS — M75.121 NONTRAUMATIC COMPLETE TEAR OF RIGHT ROTATOR CUFF: Primary | ICD-10-CM

## 2024-11-04 PROBLEM — M25.511 RIGHT SHOULDER PAIN: Status: ACTIVE | Noted: 2024-11-04

## 2024-11-04 PROCEDURE — 99204 OFFICE O/P NEW MOD 45 MIN: CPT | Performed by: ORTHOPAEDIC SURGERY

## 2024-11-04 RX ORDER — ASPIRIN 325 MG
325 TABLET ORAL DAILY
COMMUNITY

## 2024-11-04 RX ORDER — AMLODIPINE BESYLATE 5 MG/1
5 TABLET ORAL DAILY
COMMUNITY

## 2024-11-04 RX ORDER — IRBESARTAN 300 MG/1
300 TABLET ORAL DAILY
COMMUNITY

## 2024-11-04 RX ORDER — GLIMEPIRIDE 2 MG/1
2 TABLET ORAL DAILY
COMMUNITY
Start: 2024-10-10

## 2024-11-04 RX ORDER — TADALAFIL 5 MG/1
5 TABLET ORAL DAILY
COMMUNITY

## 2024-11-04 RX ORDER — PRAVASTATIN SODIUM 80 MG/1
80 TABLET ORAL DAILY
COMMUNITY

## 2024-11-04 NOTE — PROGRESS NOTES
Orthopaedic Clinic Note    NAME:  Mahendra Patrick   : 1965  MRN: [id number]      2024     CHIEF COMPLAINT:  Right shoulder pain      HISTORY OF PRESENT ILLNESS:   Mahendra is a 59 y.o. male who presents to the office for evaluation and treatment of the right shoulder.   Pain began years ago when he was in his 30s.  He states he was lifting something when he felt a sharp pain and pop in his shoulder.  He had several months of pain but his pain gradually improved over time.  He states he is always had some pain in his shoulder but recently has been worsening.  He had a knee arthroplasty performed last year has been using his arms more so to help him elevate from a chair and his pain has been worsening especially at night.  He has tried anti-inflammatories without relief.    Past Medical History:        Diagnosis Date    Hypercholesteremia     Hypertension        Past Surgical History:        Procedure Laterality Date    CHOLECYSTECTOMY      TOTAL KNEE ARTHROPLASTY Right     WISDOM TOOTH EXTRACTION         Current Medications:   Prior to Admission medications    Medication Sig Start Date End Date Taking? Authorizing Provider   amLODIPine (NORVASC) 5 MG tablet Take 1 tablet by mouth daily   Yes Loly Wilkerson MD   aspirin 325 MG tablet Take 1 tablet by mouth daily   Yes Provider, MD Loly   glimepiride (AMARYL) 2 MG tablet Take 1 tablet by mouth daily 10/10/24  Yes ProviderLoly MD   irbesartan (AVAPRO) 300 MG tablet Take 1 tablet by mouth daily   Yes Loly Wilkerson MD   omeprazole (PRILOSEC) 20 MG delayed release capsule Take 1 capsule by mouth daily   Yes Loly Wilkerson MD   pravastatin (PRAVACHOL) 80 MG tablet Take 1 tablet by mouth daily   Yes Loly Wilkerson MD   tadalafil (CIALIS) 5 MG tablet Take 1 tablet by mouth daily   Yes ProviderLoly MD       Allergies:  Ace inhibitors    Social History:   Social History     Occupational History    Not on file   Tobacco

## 2024-11-08 ENCOUNTER — HOSPITAL ENCOUNTER (OUTPATIENT)
Dept: MRI IMAGING | Age: 59
Discharge: HOME | End: 2024-11-08
Payer: COMMERCIAL

## 2024-11-08 DIAGNOSIS — M75.121 NONTRAUMATIC COMPLETE TEAR OF RIGHT ROTATOR CUFF: ICD-10-CM

## 2024-11-08 PROCEDURE — 73221 MRI JOINT UPR EXTREM W/O DYE: CPT

## 2024-12-01 NOTE — PROGRESS NOTES
Cigarettes     Start date:      Quit date: 2023     Years since quittin.2    Smokeless tobacco: Current     Types: Chew   Vaping Use    Vaping status: Never Used   Substance and Sexual Activity    Alcohol use: Not on file    Drug use: Not on file    Sexual activity: Not on file        Family History:   No family history on file.    Review of Systems  System  Neg/Pos  Details  Constitutional  Negative  Chills, Fatigue, Fever and Night Sweats  Respiratory  Negative  Chest Pain, Cough and Dyspnea  Cardio   Negative  Leg Swelling  GI   Negative  Abdominal Pain, Constipation, Nausea and Vomiting     Negative  Urinary Incontinence   Endocrine  Negative  Weight Gain and Weight Loss  MS   Negative  Except as noted in HPI and Chief Complaint    PHYSICAL EXAM:    Vitals:   Vitals:    24 1457   Weight: 118.9 kg (262 lb 3.2 oz)   Height: 1.88 m (6' 2\")       General:  Appears stated age, no distress.  Orientation:  Alert and oriented to time, place, and person.  Mood and Affect:  Cooperative and pleasant.  Gait: Heel to gait  Cardiovascular:  Symmetric 1-2 plus pulses in upper and lower extremities.  Lymph:  No cervical or inguinal lymphadenopathy noted.  Sensation:  Grossly intact to light touch.  DTR:  Normal, no pathologic reflexes.  Coordination/balance:  Normal    Musculoskeletal:  Right Shoulder:    Tenderness to palpation anterior lateral  Motion: flexion 160, Abduction 140, ER 40, IR 40  Strength: 4-5 abduction external rotation strength, 5-5 internal rotation  Stability: normal  Skin: normal   + Speeds + Yergason's     Radiology: MRI of the right shoulder was reviewed.  I reviewed the images as well as the report.  He has a diseased appearing biceps with a large amount of fluid surrounding and within the bicipital sheath.  He also has early arthritic change of the glenohumeral joint.     Narrative & Impression  EXAM:  MRI OF THE RIGHT SHOULDER WITHOUT CONTRAST     HISTORY:  Chronic right shoulder

## 2024-12-02 ENCOUNTER — OFFICE VISIT (OUTPATIENT)
Age: 59
End: 2024-12-02
Payer: COMMERCIAL

## 2024-12-02 VITALS — HEIGHT: 74 IN | WEIGHT: 262.2 LBS | BODY MASS INDEX: 33.65 KG/M2

## 2024-12-02 DIAGNOSIS — M19.011 PRIMARY OSTEOARTHRITIS, RIGHT SHOULDER: Primary | ICD-10-CM

## 2024-12-02 DIAGNOSIS — M75.21 BICEPS TENDINITIS OF RIGHT SHOULDER: ICD-10-CM

## 2024-12-02 PROCEDURE — 99214 OFFICE O/P EST MOD 30 MIN: CPT | Performed by: ORTHOPAEDIC SURGERY

## 2024-12-02 PROCEDURE — 20610 DRAIN/INJ JOINT/BURSA W/O US: CPT | Performed by: ORTHOPAEDIC SURGERY

## 2024-12-02 RX ORDER — LIDOCAINE HYDROCHLORIDE 10 MG/ML
2 INJECTION, SOLUTION INFILTRATION; PERINEURAL ONCE
Status: COMPLETED | OUTPATIENT
Start: 2024-12-02 | End: 2024-12-02

## 2024-12-02 RX ORDER — BUPIVACAINE HYDROCHLORIDE 2.5 MG/ML
2 INJECTION, SOLUTION INFILTRATION; PERINEURAL ONCE
Status: COMPLETED | OUTPATIENT
Start: 2024-12-02 | End: 2024-12-02

## 2024-12-02 RX ORDER — TRIAMCINOLONE ACETONIDE 40 MG/ML
40 INJECTION, SUSPENSION INTRA-ARTICULAR; INTRAMUSCULAR ONCE
Status: COMPLETED | OUTPATIENT
Start: 2024-12-02 | End: 2024-12-02

## 2024-12-02 RX ADMIN — BUPIVACAINE HYDROCHLORIDE 5 MG: 2.5 INJECTION, SOLUTION INFILTRATION; PERINEURAL at 16:31

## 2024-12-02 RX ADMIN — LIDOCAINE HYDROCHLORIDE 2 ML: 10 INJECTION, SOLUTION INFILTRATION; PERINEURAL at 16:30

## 2024-12-02 RX ADMIN — TRIAMCINOLONE ACETONIDE 40 MG: 40 INJECTION, SUSPENSION INTRA-ARTICULAR; INTRAMUSCULAR at 16:29

## 2025-02-03 ENCOUNTER — OFFICE VISIT (OUTPATIENT)
Age: 60
End: 2025-02-03
Payer: COMMERCIAL

## 2025-02-03 VITALS — BODY MASS INDEX: 32.39 KG/M2 | WEIGHT: 252.4 LBS | HEIGHT: 74 IN

## 2025-02-03 DIAGNOSIS — M75.21 BICEPS TENDINITIS OF RIGHT SHOULDER: Primary | ICD-10-CM

## 2025-02-03 DIAGNOSIS — M19.011 PRIMARY OSTEOARTHRITIS, RIGHT SHOULDER: ICD-10-CM

## 2025-02-03 PROCEDURE — 99213 OFFICE O/P EST LOW 20 MIN: CPT | Performed by: ORTHOPAEDIC SURGERY

## 2025-02-03 NOTE — PROGRESS NOTES
Orthopaedic Clinic Note    NAME:  Mahendra Patrick   : 1965  MRN: [id number]      2/3/2025     CHIEF COMPLAINT:  follow up Right shoulder pain      HISTORY OF PRESENT ILLNESS:   Mahendra is a 59 y.o. male who presents to the office for repeat evaluation and treatment of the right shoulder.  As a review, pain began years ago when he was in his 30s.  He states he was lifting something when he felt a sharp pain and pop in his shoulder.  He had several months of pain but his pain gradually improved over time.  He states he is always had some pain in his shoulder but recently has been worsening. An MRI was reviewed last visit and he was given a corticosteroid injection for biceps tendinitis and primary osteoarthritis.  He tells me the injection helped tremendously.  He is having minimal pain.  It took approximately a week for his injection to really eliminate his symptoms.    Past Medical History:        Diagnosis Date    Hypercholesteremia     Hypertension        Past Surgical History:        Procedure Laterality Date    CHOLECYSTECTOMY      TOTAL KNEE ARTHROPLASTY Right     WISDOM TOOTH EXTRACTION         Current Medications:   Prior to Admission medications    Medication Sig Start Date End Date Taking? Authorizing Provider   amLODIPine (NORVASC) 5 MG tablet Take 1 tablet by mouth daily   Yes Loly Wilkerson MD   aspirin 325 MG tablet Take 1 tablet by mouth daily   Yes Loly Wilkerson MD   glimepiride (AMARYL) 2 MG tablet Take 1 tablet by mouth daily 10/10/24  Yes Loly Wilkerson MD   irbesartan (AVAPRO) 300 MG tablet Take 1 tablet by mouth daily   Yes Loly Wilkerson MD   omeprazole (PRILOSEC) 20 MG delayed release capsule Take 1 capsule by mouth daily   Yes Loly Wilkerson MD   pravastatin (PRAVACHOL) 80 MG tablet Take 1 tablet by mouth daily   Yes Loly Wilkerson MD   tadalafil (CIALIS) 5 MG tablet Take 1 tablet by mouth daily   Yes Loly Wilkerson MD       Allergies:  Ace

## (undated) DEVICE — Device: Brand: DEFENDO AIR/WATER/SUCTION AND BIOPSY VALVE

## (undated) DEVICE — CUFF,BP,DISP,1 TUBE,ADULT,HP: Brand: MEDLINE

## (undated) DEVICE — SNAR POLYP SENSATION MICRO OVL 13 240X40

## (undated) DEVICE — THE CHANNEL CLEANING BRUSH IS A NYLON FLEXI BRUSH ATTACHED TO A FLEXIBLE PLASTIC SHEATH DESIGNED TO SAFELY REMOVE DEBRIS FROM FLEXIBLE ENDOSCOPES.

## (undated) DEVICE — SENSR O2 OXIMAX FNGR A/ 18IN NONSTR

## (undated) DEVICE — ENDOGATOR AUXILIARY WATER JET CONNECTOR: Brand: ENDOGATOR

## (undated) DEVICE — TBG SMPL FLTR LINE NASL 02/C02 A/ BX/100

## (undated) DEVICE — FRCP BX RADJAW4 NDL 2.8 240 STD OG

## (undated) DEVICE — THE SINGLE USE ETRAP – POLYP TRAP IS USED FOR SUCTION RETRIEVAL OF ENDOSCOPICALLY REMOVED POLYPS.: Brand: ETRAP

## (undated) DEVICE — MASK,OXYGEN,MED CONC,ADLT,7' TUB, UC: Brand: PENDING

## (undated) DEVICE — YANKAUER,BULB TIP WITH VENT: Brand: ARGYLE